# Patient Record
Sex: FEMALE | Race: BLACK OR AFRICAN AMERICAN | Employment: FULL TIME | URBAN - METROPOLITAN AREA
[De-identification: names, ages, dates, MRNs, and addresses within clinical notes are randomized per-mention and may not be internally consistent; named-entity substitution may affect disease eponyms.]

---

## 2017-08-19 ENCOUNTER — HOSPITAL ENCOUNTER (INPATIENT)
Age: 22
LOS: 3 days | Discharge: HOME | DRG: 881 | End: 2017-08-22
Attending: PSYCHIATRY & NEUROLOGY | Admitting: PSYCHIATRY & NEUROLOGY
Payer: SUBSIDIZED

## 2017-08-19 PROBLEM — F33.1 MDD (MAJOR DEPRESSIVE DISORDER), RECURRENT EPISODE, MODERATE (HCC): Status: ACTIVE | Noted: 2017-08-19

## 2017-08-19 PROCEDURE — 74011250637 HC RX REV CODE- 250/637: Performed by: PSYCHIATRY & NEUROLOGY

## 2017-08-19 PROCEDURE — 65220000003 HC RM SEMIPRIVATE PSYCH

## 2017-08-19 RX ORDER — ZOLPIDEM TARTRATE 10 MG/1
10 TABLET ORAL
Status: DISCONTINUED | OUTPATIENT
Start: 2017-08-19 | End: 2017-08-22 | Stop reason: HOSPADM

## 2017-08-19 RX ORDER — ACETAMINOPHEN 325 MG/1
650 TABLET ORAL
Status: DISCONTINUED | OUTPATIENT
Start: 2017-08-19 | End: 2017-08-19 | Stop reason: SDUPTHER

## 2017-08-19 RX ORDER — IBUPROFEN 400 MG/1
400 TABLET ORAL
Status: DISCONTINUED | OUTPATIENT
Start: 2017-08-19 | End: 2017-08-22 | Stop reason: HOSPADM

## 2017-08-19 RX ORDER — ACETAMINOPHEN 325 MG/1
650 TABLET ORAL
Status: DISCONTINUED | OUTPATIENT
Start: 2017-08-19 | End: 2017-08-22 | Stop reason: HOSPADM

## 2017-08-19 RX ORDER — BENZTROPINE MESYLATE 1 MG/ML
2 INJECTION INTRAMUSCULAR; INTRAVENOUS
Status: DISCONTINUED | OUTPATIENT
Start: 2017-08-19 | End: 2017-08-22 | Stop reason: HOSPADM

## 2017-08-19 RX ORDER — OLANZAPINE 5 MG/1
5 TABLET ORAL
Status: DISCONTINUED | OUTPATIENT
Start: 2017-08-19 | End: 2017-08-22 | Stop reason: HOSPADM

## 2017-08-19 RX ORDER — BENZTROPINE MESYLATE 2 MG/1
2 TABLET ORAL
Status: DISCONTINUED | OUTPATIENT
Start: 2017-08-19 | End: 2017-08-22 | Stop reason: HOSPADM

## 2017-08-19 RX ORDER — LORAZEPAM 2 MG/ML
2 INJECTION INTRAMUSCULAR
Status: DISCONTINUED | OUTPATIENT
Start: 2017-08-19 | End: 2017-08-22 | Stop reason: HOSPADM

## 2017-08-19 RX ORDER — IBUPROFEN 200 MG
1 TABLET ORAL
Status: DISCONTINUED | OUTPATIENT
Start: 2017-08-19 | End: 2017-08-22 | Stop reason: HOSPADM

## 2017-08-19 RX ORDER — HYDROXYZINE 50 MG/1
50 TABLET, FILM COATED ORAL
Status: DISCONTINUED | OUTPATIENT
Start: 2017-08-19 | End: 2017-08-22 | Stop reason: HOSPADM

## 2017-08-19 RX ORDER — ADHESIVE BANDAGE
30 BANDAGE TOPICAL DAILY PRN
Status: DISCONTINUED | OUTPATIENT
Start: 2017-08-19 | End: 2017-08-22 | Stop reason: HOSPADM

## 2017-08-19 RX ADMIN — IBUPROFEN 400 MG: 400 TABLET, FILM COATED ORAL at 16:46

## 2017-08-19 RX ADMIN — ZOLPIDEM TARTRATE 10 MG: 10 TABLET ORAL at 21:14
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
## 2017-08-19 NOTE — IP AVS SNAPSHOT
Summary of Care Report The Summary of Care report has been created to help improve care coordination. Users with access to [employer] or [address] (Web-based application) may access additional patient information including the Discharge Summary. If you are not currently a [address] user and need more information, please call the number listed below in the [place] [address] section and ask to be connected with Medical Records. Facility Information Name Address Phone [address] [facility] 7 [phone] [phone] Patient Information Patient Name Sex  Nicole Pierre ([id number]) Female 1995 Discharge Information Admitting Provider Service Area Unit Awa Osuna MD / Georges Teague 140 / [phone] Discharge Provider Discharge Date/Time Discharge Disposition Destination (none) 2017 (Pending) AHR (none) Patient Language Language ENGLISH [13] Hospital Problems as of 2017  Never Reviewed Class Noted - Resolved Last Modified POA Active Problems * (Principal)MDD (major depressive disorder), recurrent episode, moderate ([facility] [address]  2017 - Present 2017 by Susy Pisano MD Unknown Entered by Awa Osuna MD  
  
You are allergic to the following Allergen Reactions Latex Hives Current Discharge Medication List  
  
START taking these medications Dose & Instructions Dispensing Information Comments  
 hydrOXYzine HCl 50 mg tablet Commonly known as:  ATARAX Dose:  50 mg Take 1 Tab by mouth four (4) times daily as needed for Itching for up to 10 days. Indications: anxiety Quantity:  30 Tab Refills:  0  
   
 sertraline 50 mg tablet Commonly known as:  ZOLOFT Dose:  50 mg Take 1 Tab by mouth daily.  Indications: ANXIETY WITH DEPRESSION  
 Quantity:  30 Tab Refills:  0 Follow-up Information Follow up With Details Comments Contact Info Dr. Sonny Hilton On 2017 You have a 9:30am appointment for medication management with the psychiatrist. Derek Su MatthewIgnacio gordillo 
[phone] Adalberto Guillaume On 2017 You have an 11:00am therapy appointment. Saen Wooten, Room 645 [facility] Ignacio Tapia 
[phone] None   None (395) Patient stated that they have no PCP Discharge Instructions DISCHARGE SUMMARY 
 
Stephen Han : 1995 MRN: [id number] The patient Tori Watkins exhibits the ability to control behavior in a less restrictive environment. Patient's level of functioning is improving. No assaultive/destructive behavior has been observed for the past 24 hours. No suicidal/homicidal threat or behavior has been observed for the past 24 hours. There is no evidence of serious medication side effects. Patient has not been in physical or protective restraints for at least the past 24 hours. If weapons involved, how are they secured? Patient's guns have been removed from the home. Is patient aware of and in agreement with discharge plan? Yes Arrangements for medication:  Prescriptions given to patient. Copy of discharge instructions to  provider?:  Yes, Dr. Sonny Hilton ([phone]) Arrangements for transportation home:  Friend to  Keep all follow up appointments as scheduled, continue to take prescribed medications per physician instructions. Mental health crisis number:  230 or your local mental health crisis line number at [phone] ([facility]) or [phone] [facility]). DISCHARGE SUMMARY from Nurse The following personal items are in your possession at time of discharge: 
 
Dental Appliances: None Visual Aid: None Home Medications: None Jewelry: Ring, Earrings, Body Piercing, With patient Clothing: Dress, Pants, Shirt, Undergarments Other Valuables: Cell Phone, Money (comment) (Cell phone in med cabinet. Money with security ) PATIENT INSTRUCTIONS: 
 
What to do at Home: 
Recommended activity: Activity as tolerated, If you experience any of the following symptoms thoughts of harming self, feeling overwhelmed with hopelessness, please follow up with your therapist and psychiatrist. . 
 
 
*  Please give a list of your current medications to your Primary Care Provider. *  Please update this list whenever your medications are discontinued, doses are 
    changed, or new medications (including over-the-counter products) are added. *  Please carry medication information at all times in case of emergency situations. These are general instructions for a healthy lifestyle: No smoking/ No tobacco products/ Avoid exposure to second hand smoke Surgeon General's Warning:  Quitting smoking now greatly reduces serious risk to your health. Obesity, smoking, and sedentary lifestyle greatly increases your risk for illness A healthy diet, regular physical exercise & weight monitoring are important for maintaining a healthy lifestyle You may be retaining fluid if you have a history of heart failure or if you experience any of the following symptoms:  Weight gain of 3 pounds or more overnight or 5 pounds in a week, increased swelling in our hands or feet or shortness of breath while lying flat in bed. Please call your doctor as soon as you notice any of these symptoms; do not wait until your next office visit. Recognize signs and symptoms of STROKE: 
 
F-face looks uneven A-arms unable to move or move unevenly S-speech slurred or non-existent T-time-call 911 as soon as signs and symptoms begin-DO NOT go Back to bed or wait to see if you get better-TIME IS BRAIN.  
 
Warning Signs of HEART ATTACK  
 
 Call 911 if you have these symptoms: 
? Chest discomfort. Most heart attacks involve discomfort in the center of the chest that lasts more than a few minutes, or that goes away and comes back. It can feel like uncomfortable pressure, squeezing, fullness, or pain. ? Discomfort in other areas of the upper body. Symptoms can include pain or discomfort in one or both arms, the back, neck, jaw, or stomach. ? Shortness of breath with or without chest discomfort. ? Other signs may include breaking out in a cold sweat, nausea, or lightheadedness. Don't wait more than five minutes to call [address]! Fast action can save your life. Calling 911 is almost always the fastest way to get lifesaving treatment. Emergency Medical Services staff can begin treatment when they arrive  up to an hour sooner than if someone gets to the hospital by car. The discharge information has been reviewed with the patient. The patient verbalized understanding. Discharge medications reviewed with the patient and appropriate educational materials and side effects teaching were provided. Chart Review Routing History No Routing History on File
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
## 2017-08-19 NOTE — IP AVS SNAPSHOT
[address] 
[phone] Patient: Agustin Andre MRN: [id number] :1995 You are allergic to the following Allergen Reactions Latex Hives Recent Documentation Height Weight Breastfeeding? BMI Smoking Status 1.676 m 111.9 kg No 39.83 kg/m2 Never Smoker About your hospitalization You were admitted on:  2017 You last received care in the:  [address] [place] [address] You were discharged on:  2017 Unit phone number:  [phone] Why you were hospitalized Your primary diagnosis was:  Mdd (Major Depressive Disorder), Recurrent Episode, Moderate (Hcc) Providers Seen During Your Hospitalizations Provider Role Specialty Primary office phone Louis Holloway MD Attending Provider Psychiatry [phone] Debby Wagoner MD Attending Provider Psychiatry [phone] Your Primary Care Physician (PCP) Primary Care Physician Office Phone Office Fax NONE ** None ** ** None ** Follow-up Information Follow up With Details Comments Contact Info Dr. Lloyd Harris On 2017 You have a 9:30am appointment for medication management with the psychiatrist. Rakesh Teague [address] [place] [address] 
[phone] Mago Mcmillan On 2017 You have an 11:00am therapy appointment. Sean Wooten, Room 645 [facility] Ignacio Tapia 
[phone] None   None (395) Patient stated that they have no PCP Current Discharge Medication List  
  
START taking these medications Dose & Instructions Dispensing Information Comments Morning Noon Evening Bedtime  
 hydrOXYzine HCl 50 mg tablet Commonly known as:  ATARAX Your last dose was: Your next dose is:    
   
   
 Dose:  50 mg Take 1 Tab by mouth four (4) times daily as needed for Itching for up to 10 days. Indications: anxiety Quantity:  30 Tab Refills:  0  
     
   
   
   
  
 sertraline 50 mg tablet Commonly known as:  ZOLOFT Your last dose was: Your next dose is:    
   
   
 Dose:  50 mg Take 1 Tab by mouth daily. Indications: ANXIETY WITH DEPRESSION Quantity:  30 Tab Refills:  0 Where to Get Your Medications Information on where to get these meds will be given to you by the nurse or doctor. ! Ask your nurse or doctor about these medications  
  hydrOXYzine HCl 50 mg tablet  
 sertraline 50 mg tablet Discharge Instructions DISCHARGE SUMMARY 
 
Israel Bills : 1995 MRN: [id number] The patient Zander Pineda exhibits the ability to control behavior in a less restrictive environment. Patient's level of functioning is improving. No assaultive/destructive behavior has been observed for the past 24 hours. No suicidal/homicidal threat or behavior has been observed for the past 24 hours. There is no evidence of serious medication side effects. Patient has not been in physical or protective restraints for at least the past 24 hours. If weapons involved, how are they secured? Patient's guns have been removed from the home. Is patient aware of and in agreement with discharge plan? Yes Arrangements for medication:  Prescriptions given to patient. Copy of discharge instructions to  provider?:  Yes, Dr. Damaris Parsons ([phone]) Arrangements for transportation home:  Friend to  Keep all follow up appointments as scheduled, continue to take prescribed medications per physician instructions. Mental health crisis number:  616 or your local mental health crisis line number at [phone] ([place] [address]) or [phone] [facility]). DISCHARGE SUMMARY from Nurse The following personal items are in your possession at time of discharge: 
 
Dental Appliances: None Visual Aid: None Home Medications: None Jewelry: Ring, Earrings, Body Piercing, With patient Clothing: Dress, Pants, Shirt, Undergarments Other Valuables: Cell Phone, Money (comment) (Cell phone in med cabinet. Money with security ) PATIENT INSTRUCTIONS: 
 
What to do at Home: 
Recommended activity: Activity as tolerated, If you experience any of the following symptoms thoughts of harming self, feeling overwhelmed with hopelessness, please follow up with your therapist and psychiatrist. . 
 
 
*  Please give a list of your current medications to your Primary Care Provider. *  Please update this list whenever your medications are discontinued, doses are 
    changed, or new medications (including over-the-counter products) are added. *  Please carry medication information at all times in case of emergency situations. These are general instructions for a healthy lifestyle: No smoking/ No tobacco products/ Avoid exposure to second hand smoke Surgeon General's Warning:  Quitting smoking now greatly reduces serious risk to your health. Obesity, smoking, and sedentary lifestyle greatly increases your risk for illness A healthy diet, regular physical exercise & weight monitoring are important for maintaining a healthy lifestyle You may be retaining fluid if you have a history of heart failure or if you experience any of the following symptoms:  Weight gain of 3 pounds or more overnight or 5 pounds in a week, increased swelling in our hands or feet or shortness of breath while lying flat in bed. Please call your doctor as soon as you notice any of these symptoms; do not wait until your next office visit. Recognize signs and symptoms of STROKE: 
 
F-face looks uneven A-arms unable to move or move unevenly S-speech slurred or non-existent T-time-call 911 as soon as signs and symptoms begin-DO NOT go  
 Back to bed or wait to see if you get better-TIME IS BRAIN. Warning Signs of HEART ATTACK Call 911 if you have these symptoms: 
? Chest discomfort. Most heart attacks involve discomfort in the center of the chest that lasts more than a few minutes, or that goes away and comes back. It can feel like uncomfortable pressure, squeezing, fullness, or pain. ? Discomfort in other areas of the upper body. Symptoms can include pain or discomfort in one or both arms, the back, neck, jaw, or stomach. ? Shortness of breath with or without chest discomfort. ? Other signs may include breaking out in a cold sweat, nausea, or lightheadedness. Don't wait more than five minutes to call [address]! Fast action can save your life. Calling 911 is almost always the fastest way to get lifesaving treatment. Emergency Medical Services staff can begin treatment when they arrive  up to an hour sooner than if someone gets to the hospital by car. The discharge information has been reviewed with the patient. The patient verbalized understanding. Discharge medications reviewed with the patient and appropriate educational materials and side effects teaching were provided. Discharge Orders None [employer] Announcement We are excited to announce that we are making your provider's discharge notes available to you in QRuso. You will see these notes when they are completed and signed by the physician that discharged you from your recent hospital stay. If you have any questions or concerns about any information you see in QRuso, please call the Health Information Department where you were seen or reach out to your Primary Care Provider for more information about your plan of care. Introducing [facility] SERVICES! [facility] introduces [employer]uso patient portal. Now you can access parts of your medical record, email your doctor's office, and request medication refills online. 1. In your internet browser, go to [email or web address] 2. Click on the First Time User? Click Here link in the Sign In box. You will see the New Member Sign Up page. 3. Enter your "[employer]" Access Code exactly as it appears below. You will not need to use this code after youve completed the sign-up process. If you do not sign up before the expiration date, you must request a new code. · "[employer]" Access Code: [email or web address] Expires: 11/20/2017  2:50 PM 
 
4. Enter the last four digits of your Social Security Number (xxxx) and Date of Birth (mm/dd/yyyy) as indicated and click Submit. You will be taken to the next sign-up page. 5. Create a "[employer]" ID. This will be your "[employer]" login ID and cannot be changed, so think of one that is secure and easy to remember. 6. Create a "[employer]" password. You can change your password at any time. 7. Enter your Password Reset Question and Answer. This can be used at a later time if you forget your password. 8. Enter your e-mail address. You will receive e-mail notification when new information is available in [address] 9. Click Sign Up. You can now view and download portions of your medical record. 10. Click the Download Summary menu link to download a portable copy of your medical information. If you have questions, please visit the Frequently Asked Questions section of the "[employer]" website. Remember, "[employer]" is NOT to be used for urgent needs. For medical emergencies, dial 911. Now available from your iPhone and Android! General Information Please provide this summary of care documentation to your next provider. Patient Signature:  ____________________________________________________________ Date:  ____________________________________________________________  
  
Dewane Salen Provider Signature:  ____________________________________________________________ Date:  ____________________________________________________________
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
## 2017-08-19 NOTE — BH NOTES
TRANSFER - IN REPORT:    Verbal report received from Merari Kim RN on [facility]  being received from [facility] for routine progression of care      Report consisted of patients Situation, Background, Assessment and   Recommendations(SBAR). Information from the following report(s) SBAR was reviewed with the receiving nurse. Opportunity for questions and clarification was provided. Assessment completed upon patients arrival to unit and care assumed.

## 2017-08-19 NOTE — IP AVS SNAPSHOT
[address] 
[phone] Patient: Jostin Segundo MRN: [id number] :1995 Current Discharge Medication List  
  
START taking these medications Dose & Instructions Dispensing Information Comments Morning Noon Evening Bedtime  
 hydrOXYzine HCl 50 mg tablet Commonly known as:  ATARAX Your last dose was: Your next dose is:    
   
   
 Dose:  50 mg Take 1 Tab by mouth four (4) times daily as needed for Itching for up to 10 days. Indications: anxiety Quantity:  30 Tab Refills:  0  
     
   
   
   
  
 sertraline 50 mg tablet Commonly known as:  ZOLOFT Your last dose was: Your next dose is:    
   
   
 Dose:  50 mg Take 1 Tab by mouth daily. Indications: ANXIETY WITH DEPRESSION Quantity:  30 Tab Refills:  0 Where to Get Your Medications Information on where to get these meds will be given to you by the nurse or doctor. ! Ask your nurse or doctor about these medications  
  hydrOXYzine HCl 50 mg tablet  
 sertraline 50 mg tablet

## 2017-08-20 LAB
ANION GAP SERPL CALC-SCNC: 8 MMOL/L (ref 5–15)
BUN SERPL-MCNC: 13 MG/DL (ref 6–20)
BUN/CREAT SERPL: 20 (ref 12–20)
CALCIUM SERPL-MCNC: 9 MG/DL (ref 8.5–10.1)
CHLORIDE SERPL-SCNC: 106 MMOL/L (ref 97–108)
CO2 SERPL-SCNC: 25 MMOL/L (ref 21–32)
CREAT SERPL-MCNC: 0.66 MG/DL (ref 0.55–1.02)
ERYTHROCYTE [DISTWIDTH] IN BLOOD BY AUTOMATED COUNT: 12.6 % (ref 11.5–14.5)
GLUCOSE SERPL-MCNC: 69 MG/DL (ref 65–100)
HCT VFR BLD AUTO: 36.4 % (ref 35–47)
HGB BLD-MCNC: 12.3 G/DL (ref 11.5–16)
MCH RBC QN AUTO: 29.9 PG (ref 26–34)
MCHC RBC AUTO-ENTMCNC: 33.8 G/DL (ref 30–36.5)
MCV RBC AUTO: 88.6 FL (ref 80–99)
PLATELET # BLD AUTO: 196 K/UL (ref 150–400)
POTASSIUM SERPL-SCNC: 3.7 MMOL/L (ref 3.5–5.1)
RBC # BLD AUTO: 4.11 M/UL (ref 3.8–5.2)
SODIUM SERPL-SCNC: 139 MMOL/L (ref 136–145)
TSH SERPL DL<=0.05 MIU/L-ACNC: 0.7 UIU/ML (ref 0.36–3.74)
WBC # BLD AUTO: 7.4 K/UL (ref 3.6–11)

## 2017-08-20 PROCEDURE — 84443 ASSAY THYROID STIM HORMONE: CPT | Performed by: HOSPITALIST

## 2017-08-20 PROCEDURE — 85027 COMPLETE CBC AUTOMATED: CPT | Performed by: HOSPITALIST

## 2017-08-20 PROCEDURE — 65220000003 HC RM SEMIPRIVATE PSYCH

## 2017-08-20 PROCEDURE — 80048 BASIC METABOLIC PNL TOTAL CA: CPT | Performed by: HOSPITALIST

## 2017-08-20 PROCEDURE — 36415 COLL VENOUS BLD VENIPUNCTURE: CPT | Performed by: HOSPITALIST

## 2017-08-20 PROCEDURE — 74011250637 HC RX REV CODE- 250/637: Performed by: PSYCHIATRY & NEUROLOGY

## 2017-08-20 RX ADMIN — ZOLPIDEM TARTRATE 10 MG: 10 TABLET ORAL at 22:03

## 2017-08-20 RX ADMIN — HYDROXYZINE HYDROCHLORIDE 50 MG: 50 TABLET, FILM COATED ORAL at 20:39

## 2017-08-20 NOTE — PROGRESS NOTES
Problem: Falls - Risk of  Goal: *Absence of Falls  Document Britany Fall Risk and appropriate interventions in the flowsheet.    Outcome: Progressing Towards Goal  Fall Risk Interventions:              Medication Interventions: Teach patient to arise slowly
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
## 2017-08-20 NOTE — PROGRESS NOTES
Problem: Depressed Mood (Adult/Pediatric) goal will met by 8/23/17  Goal: *STG: Participates in treatment plan  Outcome: Progressing Towards Goal  Pt participated in treatment team.  Goal: *STG: Verbalizes anger, guilt, and other feelings in a constructive manor  Outcome: Progressing Towards Goal  Pt able to verbalize feelings in a constructive manor. Goal: *STG: Attends activities and groups  Outcome: Progressing Towards Goal  Encouraged pt to attend groups and express feelings. Goal: *STG: Demonstrates reduction in symptoms and increase in insight into coping skills/future focused  Outcome: Progressing Towards Goal  Offer pt information on coping skills. Goal: *STG: Remains safe in hospital  Outcome: Progressing Towards Goal  Remains safe in the hospital. Continued on Q 15 minute safety checks. Goal: *STG: Complies with medication therapy  Outcome: Progressing Towards Goal  Not on scheduled medications  Goal: *LTG: Understands illness and can identify signs of relapse  Outcome: Progressing Towards Goal  Reviewed during treatment team causes of negative thoughts. Problem: Falls - Risk of goal will be met by 8/22/17  Goal: *Absence of Falls  Document Gely Alcantar Fall Risk and appropriate interventions in the flowsheet. Outcome: Progressing Towards Goal  Fall Risk Interventions:              Medication Interventions: Teach patient to arise slowly        Pt wearing slip resistant socks and bed in low position. Problem: Anxiety goal will be met by 8/23/17  Goal: *Alleviation of anxiety  Outcome: Progressing Towards Goal  Encouraged pt to express feelings of anxiety with staff.     [address] [facility] [address]  Master Treatment Plan for [facility]    Date Treatment Plan Initiated: 8/20/17    Treatment Plan Modalities:  Type of Modality Amount  (x minutes) Frequency (x/week) Duration (x days) Name of Responsible Staff   Community & wrap-up meetings to encourage peer interactions 15 7 1 Santhosh Doan RN     Group psychotherapy to assist in building coping skills and internal controls 60 7 1 Shawn Ortiz LCSW   Therapeutic activity groups to build coping skills 60 7 1 Shawn ortiz LCSW   Psychoeducation in group setting to address:   Medication education   15 7 1 Santhosh Doan RN   Coping skills         Relaxation techniques         Symptom management         Discharge planning   60 2 [address], [place]   Spirituality    60 2 [facility]   60 1 1 Volunteer [facility]/AA/NA         Physician medication management   13 7 1 Dr. Charlene Reddy meeting/discharge planning

## 2017-08-20 NOTE — PROGRESS NOTES
Problem: Depressed Mood (Adult/Pediatric)  Goal: *STG: Verbalizes anger, guilt, and other feelings in a constructive manor  Outcome: Progressing Towards Goal  Pt in bed at change of shift. Up for dinner. Feels as if she is getting a cold. Reports little change in mood. Spoke of relationship with son's father and her best friend as stressors. Has been seeing a counselor for the past two years in an effort to \"get myself together. \" Support and reassurance given. Will continue to monitor.

## 2017-08-21 PROCEDURE — 74011250637 HC RX REV CODE- 250/637: Performed by: PSYCHIATRY & NEUROLOGY

## 2017-08-21 PROCEDURE — 65220000003 HC RM SEMIPRIVATE PSYCH

## 2017-08-21 RX ORDER — SERTRALINE HYDROCHLORIDE 50 MG/1
50 TABLET, FILM COATED ORAL DAILY
Status: DISCONTINUED | OUTPATIENT
Start: 2017-08-21 | End: 2017-08-22 | Stop reason: HOSPADM

## 2017-08-21 RX ADMIN — SERTRALINE HYDROCHLORIDE 50 MG: 50 TABLET ORAL at 13:24

## 2017-08-21 RX ADMIN — ZOLPIDEM TARTRATE 10 MG: 10 TABLET ORAL at 21:28

## 2017-08-21 RX ADMIN — HYDROXYZINE HYDROCHLORIDE 50 MG: 50 TABLET, FILM COATED ORAL at 13:24

## 2017-08-21 NOTE — PROGRESS NOTES
Problem: Falls - Risk of  Goal: *Absence of Falls  Document Britany Fall Risk and appropriate interventions in the flowsheet.    Outcome: Progressing Towards Goal  Fall Risk Interventions:   Lying quietly in bed with eyes closed , respirations even and unlabored , NAD noted   Bathroom light on for safety , Q15 min safety rounds continue         Medication Interventions: Teach patient to arise slowly

## 2017-08-21 NOTE — PROGRESS NOTES
Problem: Depressed Mood (Adult/Pediatric)  Goal: *STG: Remains safe in hospital  Outcome: Progressing Towards Goal  Pt visible in milieu. Retreats to room intermittently. Visited by grandmothers from [place]. Mood remained flat, blunted during visit. Brightened immediately after visit while speaking on phone. Pt has not demonstrated any self harming behavior. Denying SI but still feeling overwhelmed. Support and reassurance given. Will continue to monitor.

## 2017-08-21 NOTE — INTERDISCIPLINARY ROUNDS
Behavioral Health Interdisciplinary Rounds     Patient Name: Zohra Cotton  Age: 25 y.o. Room/Bed:  727/  Primary Diagnosis: MDD (major depressive disorder), recurrent episode, moderate (HCC)   Admission Status: Voluntary     Readmission within 30 days: no  Power of  in place: no  Patient requires a blocked bed: no          Reason for blocked bed: n/a     VTE Prophylaxis: Not indicated  Mobility needs/Fall risk: no    Nutritional Plan: no  Consults:       Labs/Testing due today?: no    Sleep hours:  8 hours       Participation in Care/Groups:  yes  Medication Compliant?: no scheduled meds ordered   PRNS (last 24 hours): Antianxiety and Sleep Aid    Restraints (last 24 hours):  no  Substance Abuse:  no  CIWA (range last 24 hours):  COWS (range last 24 hours):    Alcohol screening (AUDIT) completed -  AUDIT Score: 0  If applicable, date SBIRT discussed in treatment team AND documented:   Tobacco - patient is a smoker: no      Date tobacco education completed by RN: n/a   24 hour chart check complete: yes     Patient goal(s) for today:   Treatment team focus/goals: Plan to start on Zoloft.   LOS:  2  Expected LOS: TBD   sychiatric Advanced Care Directives -   Name of Decision maker if patient has   sychiatric Care Directive   Patient was offered information   Financial concerns/prescription coverage:    Date of last family contact:      Family requesting physician contact today:   Discharge plan: she will return home when ready for discharge      Outpatient provider(s): will need to be set up before discharge     Participating treatment team members: Tadeo Saxena Dr., RN

## 2017-08-21 NOTE — PROGRESS NOTES
Problem: Depressed Mood (Adult/Pediatric)  Goal: *STG: Participates in treatment plan  Outcome: Progressing Towards Goal  Review meds, discussed medication and d/c plans. Engaged and social w peers. Brighter affect and describes hope  Pt daily goal is to identify triggering feelings and a healthy coping skill  Goal: *STG: Verbalizes anger, guilt, and other feelings in a constructive manor  Outcome: Progressing Towards Goal  Sadness and anxiety related to her roommate and BF behaviors  Goal: *STG: Attends activities and groups  Outcome: Progressing Towards Goal  engaged  Goal: *STG: Demonstrates reduction in symptoms and increase in insight into coping skills/future focused  Outcome: Progressing Towards Goal  Denies SI, able to recognize feelings such as sadness and anxiety are trigger. Healthy coping skills she is currently using are coloring sharing in group and completing WRAP worksheets.    Goal: Interventions  Outcome: Progressing Towards Goal  Staff focus is on coping skills educaiton
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
## 2017-08-21 NOTE — PROGRESS NOTES
08/21/17 1219   Vital Signs   Temp 98.1 °F (36.7 °C)   Temp Source Oral   Pulse (Heart Rate) (!) 116   Resp Rate 18   Level of Consciousness Alert   /82   MAP (Calculated) 97   BP 1 Method Automatic   MEWS Score 3   pt anxious at this time. Will increase monitoring of vital signs.

## 2017-08-21 NOTE — BH NOTES
GROUP THERAPY PROGRESS NOTE    Kirsten Carrasco did not participate in a Morning Process Group on the General Unit with a focus on identifying feelings, planning for the day, and learning more about DBT concepts of \"Mindfulness. \"

## 2017-08-21 NOTE — BH NOTES
1530: Greeted patient on unit in dining room talking with peers. Appeared in no acute distress. 1629: Patient alert, vital stable, wnl.   Medication and meal compliant. Will continue to monitor on Q 15 minute safety checks.
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
## 2017-08-21 NOTE — BH NOTES
PRN Medication Documentation    Specific patient behavior that led to need for PRN medication: itching   Staff interventions attempted prior to PRN being given: cold compress  PRN medication given: atarax 50mg PO  Patient response/effectiveness of PRN medication: effective

## 2017-08-22 VITALS
HEIGHT: 66 IN | TEMPERATURE: 98 F | BODY MASS INDEX: 39.66 KG/M2 | WEIGHT: 246.8 LBS | HEART RATE: 99 BPM | SYSTOLIC BLOOD PRESSURE: 138 MMHG | DIASTOLIC BLOOD PRESSURE: 84 MMHG | OXYGEN SATURATION: 98 % | RESPIRATION RATE: 16 BRPM

## 2017-08-22 PROCEDURE — 74011250637 HC RX REV CODE- 250/637: Performed by: PSYCHIATRY & NEUROLOGY

## 2017-08-22 RX ORDER — HYDROXYZINE 50 MG/1
50 TABLET, FILM COATED ORAL
Qty: 30 TAB | Refills: 0 | Status: SHIPPED | OUTPATIENT
Start: 2017-08-22 | End: 2017-09-01

## 2017-08-22 RX ORDER — SERTRALINE HYDROCHLORIDE 50 MG/1
50 TABLET, FILM COATED ORAL DAILY
Qty: 30 TAB | Refills: 0 | Status: SHIPPED | OUTPATIENT
Start: 2017-08-22 | End: 2019-04-05

## 2017-08-22 RX ADMIN — SERTRALINE HYDROCHLORIDE 50 MG: 50 TABLET ORAL at 08:15

## 2017-08-22 NOTE — BH NOTES
GROUP THERAPY PROGRESS NOTE    Sangita Drummond did not participate in a morning Process Group on the General Unit with a focus identifying feelings, planning for the day, and learning more about DBT concepts on \"Emotion Regulation. \"

## 2017-08-22 NOTE — INTERDISCIPLINARY ROUNDS
Behavioral Health Interdisciplinary Rounds     Patient Name: Dash Villegas  Age: 25 y.o. Room/Bed:  [facility]  Primary Diagnosis: MDD (major depressive disorder), recurrent episode, moderate (HCC)   Admission Status: Voluntary     Readmission within 30 days: no  Power of  in place: no  Patient requires a blocked bed: no          Reason for blocked bed: n/a    VTE Prophylaxis: Not indicated  Mobility needs/Fall risk: no    Nutritional Plan: no  Consults: no         Labs/Testing due today?: no    Sleep hours: 8       Participation in Care/Groups:  yes  Medication Compliant?: Yes  PRNS (last 24 hours): Antianxiety and Sleep Aid    Restraints (last 24 hours):  no  Substance Abuse:  no  CIWA (range last 24 hours):  COWS (range last 24 hours):   Alcohol screening (AUDIT) completed -  AUDIT Score: 0  If applicable, date SBIRT discussed in treatment team AND documented: n/a  Tobacco - patient is a smoker: no   Date tobacco education completed by RN: n/a  24 hour chart check complete: yes     Patient goal(s) for today: Discharge  Treatment team focus/goals: Discharge  LOS:  3  Expected LOS: 3  Psychiatric Advanced Care Directives -  no   Name of Decision maker if patient has Psychiatric Care Directive: None   Patient was offered information, patient declined.    Financial concerns/prescription coverage: Uninsured  Date of last family contact: None    Family requesting physician contact today: No  Discharge plan: Return home       Outpatient provider(s): [address] [place] [address]    Participating treatment team members: Dash Nelly, ANGY Whitmore; Dr. Darin Askew MD; Joe Guillory RN

## 2017-08-22 NOTE — PROGRESS NOTES
Problem: Depressed Mood (Adult/Pediatric)  Goal: *STG: Participates in treatment plan  Outcome: Progressing Towards Goal  Review meds, out on unit social w peers and staff. Mood and affect annoyed and indifferent. Using sarcasm when offering answers to nursing assessment. Pt daily goal is to Ravel Law . Goal: *STG: Verbalizes anger, guilt, and other feelings in a constructive manor  Outcome: Progressing Towards Goal  Appears annoyed, described irritated when discussing her visitors from last  night  Goal: *STG: Attends activities and groups  Outcome: Progressing Towards Goal  Semi engaged  Goal: *STG: Demonstrates reduction in symptoms and increase in insight into coping skills/future focused  Outcome: Progressing Towards Goal  Denies SI, denies symptoms such as hopelessness and SI.  Insight that working on healthy boundaries, delegating to her support system needs and concerns are all healthy coping skills  Goal: *STG: Complies with medication therapy  Outcome: Progressing Towards Goal  compliant  Goal: Interventions  Outcome: Progressing Towards Goal  Staff focus is on d/c planning

## 2017-08-22 NOTE — DISCHARGE INSTRUCTIONS
DISCHARGE SUMMARY    NAME:Kamar Ribeiro  : 1995  MRN: [id number]    The patient Karri Bryant exhibits the ability to control behavior in a less restrictive environment. Patient's level of functioning is improving. No assaultive/destructive behavior has been observed for the past 24 hours. No suicidal/homicidal threat or behavior has been observed for the past 24 hours. There is no evidence of serious medication side effects. Patient has not been in physical or protective restraints for at least the past 24 hours. If weapons involved, how are they secured? Patient's guns have been removed from the home. Is patient aware of and in agreement with discharge plan? Yes    Arrangements for medication:  Prescriptions given to patient. Copy of discharge instructions to  provider?:  Yes, Dr. Shankar Lane ([phone])    Arrangements for transportation home:  Friend to     Keep all follow up appointments as scheduled, continue to take prescribed medications per physician instructions. Mental health crisis number:  490 or your local mental health crisis line number at [phone] ([place] [address]) or [phone] [facility]). DISCHARGE SUMMARY from Nurse    The following personal items are in your possession at time of discharge:    Dental Appliances: None  Visual Aid: None     Home Medications: None  Jewelry: Ring, Earrings, Body Piercing, With patient  Clothing: Dress, Pants, Shirt, Undergarments  Other Valuables: Cell Phone, Money (comment) (Cell phone in med cabinet. Money with security )             PATIENT INSTRUCTIONS:    What to do at Home:  Recommended activity: Activity as tolerated,     If you experience any of the following symptoms thoughts of harming self, feeling overwhelmed with hopelessness, please follow up with your therapist and psychiatrist. .      *  Please give a list of your current medications to your Primary Care Provider.     *  Please update this list whenever your medications are discontinued, doses are      changed, or new medications (including over-the-counter products) are added. *  Please carry medication information at all times in case of emergency situations. These are general instructions for a healthy lifestyle:    No smoking/ No tobacco products/ Avoid exposure to second hand smoke    Surgeon General's Warning:  Quitting smoking now greatly reduces serious risk to your health. Obesity, smoking, and sedentary lifestyle greatly increases your risk for illness    A healthy diet, regular physical exercise & weight monitoring are important for maintaining a healthy lifestyle    You may be retaining fluid if you have a history of heart failure or if you experience any of the following symptoms:  Weight gain of 3 pounds or more overnight or 5 pounds in a week, increased swelling in our hands or feet or shortness of breath while lying flat in bed. Please call your doctor as soon as you notice any of these symptoms; do not wait until your next office visit. Recognize signs and symptoms of STROKE:    F-face looks uneven    A-arms unable to move or move unevenly    S-speech slurred or non-existent    T-time-call 911 as soon as signs and symptoms begin-DO NOT go       Back to bed or wait to see if you get better-TIME IS BRAIN. Warning Signs of HEART ATTACK     Call 911 if you have these symptoms:   Chest discomfort. Most heart attacks involve discomfort in the center of the chest that lasts more than a few minutes, or that goes away and comes back. It can feel like uncomfortable pressure, squeezing, fullness, or pain.  Discomfort in other areas of the upper body. Symptoms can include pain or discomfort in one or both arms, the back, neck, jaw, or stomach.  Shortness of breath with or without chest discomfort.  Other signs may include breaking out in a cold sweat, nausea, or lightheadedness.   Don't wait more than five minutes to call 911 - MINUTES MATTER! Fast action can save your life. Calling 911 is almost always the fastest way to get lifesaving treatment. Emergency Medical Services staff can begin treatment when they arrive -- up to an hour sooner than if someone gets to the hospital by car. The discharge information has been reviewed with the patient. The patient verbalized understanding. Discharge medications reviewed with the patient and appropriate educational materials and side effects teaching were provided.

## 2017-08-22 NOTE — BH NOTES
Pt discharged with her family. No distress noted. Denies SI/HI at this time. All belongings returned to pt. Discharge instructions reviewed and signed pt pt.

## 2017-08-22 NOTE — BH NOTES
Pt  resting comfortably in bed. Respirations even and unlabored. NAD. Continue to monitor via 15 minute observation. 24 hour chart review completed.
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
## 2017-08-22 NOTE — BH NOTES
PRN Medication Documentation    Specific patient behavior that led to need for PRN medication: promotion of rest  Staff interventions attempted prior to PRN being given: milieu quiet, lights dim  PRN medication given: ambien 10mg po prn as ordered  Patient response/effectiveness of PRN medication: prn effective. Pt sleeping upon reassessment.
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
## 2017-08-22 NOTE — PROGRESS NOTES
Chart reviewed and discussed with nurse. AFVSS. Patient has no active medical problems (has chronic psoriatic arthritis). Will sign off as no active medical problems. Please call if urgent medical attention is needed. Thank you for allowing us to participate in the care of your patient.     Tom Gill MD, S

## 2019-04-05 ENCOUNTER — HOSPITAL ENCOUNTER (INPATIENT)
Age: 24
LOS: 3 days | Discharge: HOME | DRG: 881 | End: 2019-04-09
Attending: EMERGENCY MEDICINE | Admitting: PSYCHIATRY & NEUROLOGY
Payer: COMMERCIAL

## 2019-04-05 DIAGNOSIS — R45.851 SUICIDAL IDEATION: Primary | ICD-10-CM

## 2019-04-05 LAB
BASOPHILS # BLD: 0 K/UL (ref 0–0.1)
BASOPHILS NFR BLD: 0 % (ref 0–1)
DIFFERENTIAL METHOD BLD: ABNORMAL
EOSINOPHIL # BLD: 0.1 K/UL (ref 0–0.4)
EOSINOPHIL NFR BLD: 1 % (ref 0–7)
ERYTHROCYTE [DISTWIDTH] IN BLOOD BY AUTOMATED COUNT: 11.9 % (ref 11.5–14.5)
HCT VFR BLD AUTO: 36 % (ref 35–47)
HGB BLD-MCNC: 12.1 G/DL (ref 11.5–16)
IMM GRANULOCYTES # BLD AUTO: 0.1 K/UL (ref 0–0.04)
IMM GRANULOCYTES NFR BLD AUTO: 1 % (ref 0–0.5)
LYMPHOCYTES # BLD: 2.5 K/UL (ref 0.8–3.5)
LYMPHOCYTES NFR BLD: 26 % (ref 12–49)
MCH RBC QN AUTO: 30.8 PG (ref 26–34)
MCHC RBC AUTO-ENTMCNC: 33.6 G/DL (ref 30–36.5)
MCV RBC AUTO: 91.6 FL (ref 80–99)
MONOCYTES # BLD: 0.6 K/UL (ref 0–1)
MONOCYTES NFR BLD: 6 % (ref 5–13)
NEUTS SEG # BLD: 6.4 K/UL (ref 1.8–8)
NEUTS SEG NFR BLD: 66 % (ref 32–75)
NRBC # BLD: 0 K/UL (ref 0–0.01)
NRBC BLD-RTO: 0 PER 100 WBC
PLATELET # BLD AUTO: 215 K/UL (ref 150–400)
PMV BLD AUTO: 11 FL (ref 8.9–12.9)
RBC # BLD AUTO: 3.93 M/UL (ref 3.8–5.2)
WBC # BLD AUTO: 9.6 K/UL (ref 3.6–11)

## 2019-04-05 PROCEDURE — 80307 DRUG TEST PRSMV CHEM ANLYZR: CPT

## 2019-04-05 PROCEDURE — 74011250637 HC RX REV CODE- 250/637: Performed by: EMERGENCY MEDICINE

## 2019-04-05 PROCEDURE — 36415 COLL VENOUS BLD VENIPUNCTURE: CPT

## 2019-04-05 PROCEDURE — 99285 EMERGENCY DEPT VISIT HI MDM: CPT

## 2019-04-05 PROCEDURE — 84443 ASSAY THYROID STIM HORMONE: CPT

## 2019-04-05 PROCEDURE — 85025 COMPLETE CBC W/AUTO DIFF WBC: CPT

## 2019-04-05 PROCEDURE — 90791 PSYCH DIAGNOSTIC EVALUATION: CPT

## 2019-04-05 PROCEDURE — 80053 COMPREHEN METABOLIC PANEL: CPT

## 2019-04-05 RX ORDER — ACETAMINOPHEN 325 MG/1
650 TABLET ORAL
Status: COMPLETED | OUTPATIENT
Start: 2019-04-05 | End: 2019-04-05

## 2019-04-05 RX ADMIN — ACETAMINOPHEN 650 MG: 325 TABLET ORAL at 20:21

## 2019-04-05 NOTE — ED TRIAGE NOTES
Arrived ambulatory from home with grandmother and son. Per grandmother patient has \"been under a lot of stress and is having a mental breakdown\". Patient crying and rocking in bed covering eyes and unable to answer questions. Per grandmother patient is endorsing SI/HI, unclear to which. Patient changed into gown, belongings secured at nurses station.
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
## 2019-04-06 PROBLEM — F32.A DEPRESSIVE DISORDER: Status: ACTIVE | Noted: 2019-04-06

## 2019-04-06 LAB
ALBUMIN SERPL-MCNC: 4 G/DL (ref 3.5–5)
ALBUMIN/GLOB SERPL: 1.1 {RATIO} (ref 1.1–2.2)
ALP SERPL-CCNC: 89 U/L (ref 45–117)
ALT SERPL-CCNC: 27 U/L (ref 12–78)
AMPHET UR QL SCN: NEGATIVE
ANION GAP SERPL CALC-SCNC: 6 MMOL/L (ref 5–15)
APAP SERPL-MCNC: <2 UG/ML (ref 10–30)
APPEARANCE UR: ABNORMAL
AST SERPL-CCNC: 24 U/L (ref 15–37)
BACTERIA URNS QL MICRO: ABNORMAL /HPF
BARBITURATES UR QL SCN: NEGATIVE
BENZODIAZ UR QL: NEGATIVE
BILIRUB SERPL-MCNC: 1.3 MG/DL (ref 0.2–1)
BILIRUB UR QL: NEGATIVE
BUN SERPL-MCNC: 12 MG/DL (ref 6–20)
BUN/CREAT SERPL: 17 (ref 12–20)
CALCIUM SERPL-MCNC: 9.3 MG/DL (ref 8.5–10.1)
CANNABINOIDS UR QL SCN: NEGATIVE
CHLORIDE SERPL-SCNC: 110 MMOL/L (ref 97–108)
CO2 SERPL-SCNC: 24 MMOL/L (ref 21–32)
COCAINE UR QL SCN: NEGATIVE
COLOR UR: ABNORMAL
CREAT SERPL-MCNC: 0.7 MG/DL (ref 0.55–1.02)
DRUG SCRN COMMENT,DRGCM: NORMAL
EPITH CASTS URNS QL MICRO: ABNORMAL /LPF
ETHANOL SERPL-MCNC: <10 MG/DL
GLOBULIN SER CALC-MCNC: 3.6 G/DL (ref 2–4)
GLUCOSE SERPL-MCNC: 93 MG/DL (ref 65–100)
GLUCOSE UR STRIP.AUTO-MCNC: NEGATIVE MG/DL
HCG UR QL: NEGATIVE
HGB UR QL STRIP: ABNORMAL
KETONES UR QL STRIP.AUTO: ABNORMAL MG/DL
LEUKOCYTE ESTERASE UR QL STRIP.AUTO: ABNORMAL
METHADONE UR QL: NEGATIVE
NITRITE UR QL STRIP.AUTO: POSITIVE
OPIATES UR QL: NEGATIVE
PCP UR QL: NEGATIVE
PH UR STRIP: 5.5 [PH] (ref 5–8)
POTASSIUM SERPL-SCNC: 3.8 MMOL/L (ref 3.5–5.1)
PROT SERPL-MCNC: 7.6 G/DL (ref 6.4–8.2)
PROT UR STRIP-MCNC: ABNORMAL MG/DL
RBC #/AREA URNS HPF: ABNORMAL /HPF (ref 0–5)
SALICYLATES SERPL-MCNC: <1.7 MG/DL (ref 2.8–20)
SODIUM SERPL-SCNC: 140 MMOL/L (ref 136–145)
SP GR UR REFRACTOMETRY: 1.03 (ref 1–1.03)
TSH SERPL DL<=0.05 MIU/L-ACNC: 0.64 UIU/ML (ref 0.36–3.74)
UA: UC IF INDICATED,UAUC: ABNORMAL
UROBILINOGEN UR QL STRIP.AUTO: 1 EU/DL (ref 0.2–1)
WBC URNS QL MICRO: ABNORMAL /HPF (ref 0–4)

## 2019-04-06 PROCEDURE — 74011250637 HC RX REV CODE- 250/637

## 2019-04-06 PROCEDURE — 81001 URINALYSIS AUTO W/SCOPE: CPT

## 2019-04-06 PROCEDURE — 87077 CULTURE AEROBIC IDENTIFY: CPT

## 2019-04-06 PROCEDURE — 74011250637 HC RX REV CODE- 250/637: Performed by: PHYSICIAN ASSISTANT

## 2019-04-06 PROCEDURE — 87086 URINE CULTURE/COLONY COUNT: CPT

## 2019-04-06 PROCEDURE — 74011250637 HC RX REV CODE- 250/637: Performed by: EMERGENCY MEDICINE

## 2019-04-06 PROCEDURE — 81025 URINE PREGNANCY TEST: CPT

## 2019-04-06 PROCEDURE — 80307 DRUG TEST PRSMV CHEM ANLYZR: CPT

## 2019-04-06 PROCEDURE — 74011636637 HC RX REV CODE- 636/637: Performed by: PHYSICIAN ASSISTANT

## 2019-04-06 PROCEDURE — 87186 SC STD MICRODIL/AGAR DIL: CPT

## 2019-04-06 PROCEDURE — 65220000003 HC RM SEMIPRIVATE PSYCH

## 2019-04-06 RX ORDER — TRAZODONE HYDROCHLORIDE 50 MG/1
50 TABLET ORAL
Status: DISCONTINUED | OUTPATIENT
Start: 2019-04-06 | End: 2019-04-09 | Stop reason: HOSPADM

## 2019-04-06 RX ORDER — BENZTROPINE MESYLATE 2 MG/1
2 TABLET ORAL
Status: DISCONTINUED | OUTPATIENT
Start: 2019-04-06 | End: 2019-04-09 | Stop reason: HOSPADM

## 2019-04-06 RX ORDER — ADHESIVE BANDAGE
30 BANDAGE TOPICAL DAILY PRN
Status: DISCONTINUED | OUTPATIENT
Start: 2019-04-06 | End: 2019-04-09 | Stop reason: HOSPADM

## 2019-04-06 RX ORDER — HYDROXYZINE 50 MG/1
TABLET, FILM COATED ORAL
Status: DISPENSED
Start: 2019-04-06 | End: 2019-04-06

## 2019-04-06 RX ORDER — OLANZAPINE 5 MG/1
5 TABLET ORAL
Status: DISCONTINUED | OUTPATIENT
Start: 2019-04-06 | End: 2019-04-09 | Stop reason: HOSPADM

## 2019-04-06 RX ORDER — IBUPROFEN 400 MG/1
400 TABLET ORAL
Status: DISCONTINUED | OUTPATIENT
Start: 2019-04-06 | End: 2019-04-09 | Stop reason: HOSPADM

## 2019-04-06 RX ORDER — ACETAMINOPHEN 325 MG/1
650 TABLET ORAL
Status: DISCONTINUED | OUTPATIENT
Start: 2019-04-06 | End: 2019-04-09 | Stop reason: HOSPADM

## 2019-04-06 RX ORDER — PREDNISONE 10 MG/1
TABLET ORAL
Status: DISPENSED
Start: 2019-04-06 | End: 2019-04-06

## 2019-04-06 RX ORDER — BENZTROPINE MESYLATE 1 MG/ML
2 INJECTION INTRAMUSCULAR; INTRAVENOUS
Status: DISCONTINUED | OUTPATIENT
Start: 2019-04-06 | End: 2019-04-09 | Stop reason: HOSPADM

## 2019-04-06 RX ORDER — SERTRALINE HYDROCHLORIDE 50 MG/1
50 TABLET, FILM COATED ORAL DAILY
Status: DISCONTINUED | OUTPATIENT
Start: 2019-04-06 | End: 2019-04-09 | Stop reason: HOSPADM

## 2019-04-06 RX ORDER — HYDROXYZINE 50 MG/1
50 TABLET, FILM COATED ORAL
Status: COMPLETED | OUTPATIENT
Start: 2019-04-06 | End: 2019-04-06

## 2019-04-06 RX ORDER — ZOLPIDEM TARTRATE 10 MG/1
10 TABLET ORAL
Status: DISCONTINUED | OUTPATIENT
Start: 2019-04-06 | End: 2019-04-06 | Stop reason: CLARIF

## 2019-04-06 RX ORDER — CEPHALEXIN 500 MG/1
500 CAPSULE ORAL
Status: COMPLETED | OUTPATIENT
Start: 2019-04-06 | End: 2019-04-06

## 2019-04-06 RX ORDER — ZOLPIDEM TARTRATE 5 MG/1
5 TABLET ORAL
Status: DISCONTINUED | OUTPATIENT
Start: 2019-04-06 | End: 2019-04-06 | Stop reason: CLARIF

## 2019-04-06 RX ADMIN — HYDROXYZINE HYDROCHLORIDE 50 MG: 50 TABLET, FILM COATED ORAL at 01:32

## 2019-04-06 RX ADMIN — CEPHALEXIN 500 MG: 500 CAPSULE ORAL at 02:15

## 2019-04-06 RX ADMIN — PREDNISONE 50 MG: 10 TABLET ORAL at 01:32

## 2019-04-06 RX ADMIN — SERTRALINE HYDROCHLORIDE 50 MG: 50 TABLET ORAL at 11:37
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
## 2019-04-06 NOTE — PROGRESS NOTES
Problem: Discharge Planning Goal: *Discharge to safe environment Outcome: Progressing Towards Goal 
Note:  
Patient identifies home as a safe environment. Patient will return home upon discharge. Goal: *Knowledge of medication management Outcome: Progressing Towards Goal 
Note:  
Patient verbalizes understanding of medication regimen. Patient agrees to take all medications as prescribed. Goal: *Knowledge of discharge instructions Outcome: Progressing Towards Goal 
Note:  
Patient has understanding of discharge and would fellow as instructed Problem: Patient Education: Go to Patient Education Activity Goal: Patient/Family Education Note:  
Patient and family(upon pt's consent) would be educated on discharge plans/instructions.

## 2019-04-06 NOTE — ED NOTES
Reassessed patient at this time. She continues to be safe in her current room. Tech currently sticking patient for her lab work, and RN made patient aware that she needs to provide a urine sample. Patient states she will be able to give a sample if she can have something to drink. Patient given water. Patient continues to be very worried about her overall situation. Continues to contract for safety at this time. Belongings remain at nurses station. Curtain remains open. Best friend remains at bedside.

## 2019-04-06 NOTE — ED NOTES
Assumed care of patient at this time. Patient is currently in her room with the curtain open. Best friend at bedside. Patient states that she is under an enormous amount of stress at this time. She states that she is concerned that she may lose custody of her son. Also states her job is very stressful. She currently has exzema and psoriasis throughout her skin, she reports being itchy all over. Patient's belongings were previously secured at this nurses station. Patient currently contracts for safety. She appears depressed, anxious and worried. Will continue to monitor patient closely at this time.

## 2019-04-06 NOTE — ED NOTES
Reassessed patient at this time. She continues to remain safe in her environment. Asked PA to evaluate patient's skin due to the excessive itching that patient is experiencing. PA agreed to order some medications to help patient be more comfortable. Patient appreciative.
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
## 2019-04-06 NOTE — PROGRESS NOTES
TRANSFER - IN REPORT: 
 
Verbal report received from DENZEL Sarmiento  on link bird  being received from [facility]/ED for routine progression of care Report consisted of patients Situation, Background, Assessment and  
Recommendations(SBAR). Information from the following report(s) SBAR, Kardex, ED Summary, [facility] and Recent Results was reviewed with the receiving nurse. Opportunity for questions and clarification was provided. Assessment completed upon patients arrival to unit and care assumed.
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
## 2019-04-06 NOTE — PROGRESS NOTES
Problem: Depressed Mood (Adult/Pediatric) Goal: *STG: Participates in treatment plan 1/6/7915 [id number] by Lance Cristina Outcome: Progressing Towards Goal 
Note:  
Patient participated in treatment team.  
9/4/6398 [id number] by Lance Cristina Outcome: Progressing Towards Goal 
1/3/4235 [id number] by Lance Cristina Outcome: Progressing Towards Goal 
Goal: *STG: Verbalizes anger, guilt, and other feelings in a constructive manor Outcome: Progressing Towards Goal 
Goal: *STG: Complies with medication therapy 9/3/6143 [id number] by Lance Cristina Outcome: Progressing Towards Goal 
4/1/1079 [id number] by Lance Cristina Outcome: Progressing Towards Goal

## 2019-04-06 NOTE — ED NOTES
1900: Patient crying in room, states [facility] has opened a case on her for something work related with a child. Is afraid her son will be taken away from her. 2000: Patient requesting medication for HA, MD notifed. 2100: Patient in bed, family at bedside. Requesting medication for psoriasis. Does not take anything at home. MD notified, no orders received. 2200: SBAR report given to Krysta Paredes RN. [employer] at bedside.

## 2019-04-06 NOTE — ROUTINE PROCESS
TRANSFER - OUT REPORT: 
 
Verbal report given to Sanket Dill RN(name) on [facility]  being transferred to (unit) for routine progression of care Report consisted of patients Situation, Background, Assessment and  
Recommendations(SBAR). Information from the following report(s) SBAR, ED Summary and MAR was reviewed with the receiving nurse. Lines:    
 
Opportunity for questions and clarification was provided. Patient transported with: 
 Registered Nurse and security

## 2019-04-06 NOTE — PROGRESS NOTES
Patient participates in treatment plan. Lengthy story about life stressors, work situation, blaming others. Redirect patient to answer questions as asked and to stay focused on them. \ 
 
 
[address] Master Treatment Plan for [employer] Date Treatment Plan Initiated: 04/06/19 Treatment Plan Modalities: 
Type of Modality Amount (x minutes) Frequency (x/week) Duration (x days) Name of Responsible Staff Community & wrap-up meetings to encourage peer interactions 15 7 1 Jhon Martin Group psychotherapy to assist in building coping skills and internal controls 60 7 [address] [facility] Therapeutic activity groups to build coping skills 60 7 1 Shawn Marlow Psychoeducation in group setting to address:  
Medication education [address], RN Coping skills Relaxation techniques Symptom management Discharge planning [facility] [address] [facility] Salo Marrero 60 1 1 volunteer Recovery/AA/NA 
    volunteer Physician medication management 15 7 1 Riccardo/Mae Family meeting/discharge planning Hans Griffin

## 2019-04-06 NOTE — ED NOTES
Reassessed patient at this time. She has provided her urine sample in the past hour and all labs have now been sent. Patient questioning if she can eat and drink, she was told yes. Patient provided with a TV dinner and water per her request. Best friend states she needs to leave at this time. Patient continues to be compliant, and continues to contract for safety. She was given her Marisue Lessen and glasses from her belongings. Door is now open for easy visualization of patient.

## 2019-04-07 PROCEDURE — 65220000003 HC RM SEMIPRIVATE PSYCH

## 2019-04-07 PROCEDURE — 74011250637 HC RX REV CODE- 250/637: Performed by: PSYCHIATRY & NEUROLOGY

## 2019-04-07 PROCEDURE — 74011250637 HC RX REV CODE- 250/637: Performed by: NURSE PRACTITIONER

## 2019-04-07 PROCEDURE — 74011250637 HC RX REV CODE- 250/637

## 2019-04-07 RX ORDER — HYDROCORTISONE 1 %
CREAM (GRAM) TOPICAL
Status: DISCONTINUED | OUTPATIENT
Start: 2019-04-07 | End: 2019-04-09 | Stop reason: HOSPADM

## 2019-04-07 RX ORDER — CEFUROXIME AXETIL 250 MG/1
250 TABLET ORAL 2 TIMES DAILY
Status: DISCONTINUED | OUTPATIENT
Start: 2019-04-07 | End: 2019-04-09 | Stop reason: HOSPADM

## 2019-04-07 RX ORDER — TRIAMCINOLONE ACETONIDE 0.25 MG/G
CREAM TOPICAL
COMMUNITY
End: 2019-04-09

## 2019-04-07 RX ORDER — HYDROXYZINE 50 MG/1
50 TABLET, FILM COATED ORAL
Status: DISCONTINUED | OUTPATIENT
Start: 2019-04-07 | End: 2019-04-09 | Stop reason: HOSPADM

## 2019-04-07 RX ADMIN — HYDROCORTISONE: 1 CREAM TOPICAL at 18:40

## 2019-04-07 RX ADMIN — HYDROXYZINE HYDROCHLORIDE 50 MG: 50 TABLET, FILM COATED ORAL at 18:40

## 2019-04-07 RX ADMIN — TRAZODONE HYDROCHLORIDE 50 MG: 50 TABLET ORAL at 21:53

## 2019-04-07 RX ADMIN — CEFUROXIME AXETIL 250 MG: 250 TABLET ORAL at 14:00

## 2019-04-07 RX ADMIN — SERTRALINE HYDROCHLORIDE 50 MG: 50 TABLET ORAL at 10:51

## 2019-04-07 RX ADMIN — CEFUROXIME AXETIL 250 MG: 250 TABLET ORAL at 21:51
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
## 2019-04-07 NOTE — BH NOTES
TRANSFER - OUT REPORT: 
 
Verbal report given to Devin Hua RN, on [facility]  being transferred to room number 446 for routine progression of care Report consisted of patients Situation, Background, Assessment and  
Recommendations(SBAR). Information from the following report(s) SBAR was reviewed with the receiving nurse. Opportunity for questions and clarification was provided. Patient transported with: 
 [employer]

## 2019-04-07 NOTE — PROGRESS NOTES
1500: Patient in bed sleeping. 1630: Out on unit. Appropriate with staff and peers. Med/ meal compliant. Participated in tx team today. Will continue to monitor with q15 minute checks. 2000: Attended group this evening. Verbalized to writer frustrations with staff and patients at place of employment. Problem: Depressed Mood (Adult/Pediatric) Goal: *STG: Participates in treatment plan Outcome: Progressing Towards Goal 
Goal: *STG: Attends activities and groups Outcome: Progressing Towards Goal 
Goal: *STG: Complies with medication therapy Outcome: Progressing Towards Goal
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
## 2019-04-07 NOTE — PROGRESS NOTES
Problem: Depressed Mood (Adult/Pediatric) Goal: *STG: Participates in treatment plan Outcome: Progressing Towards Goal 
Note:  
Pt participated in treatment team. Stated \"I am calmer now because I don't have the issues in here\". Able to verbalize feelings to MD and staff. Goal: Interventions Outcome: Progressing Towards Goal 
  
Problem: Patient Education: Go to Patient Education Activity Goal: Patient/Family Education Outcome: Progressing Towards Goal

## 2019-04-07 NOTE — BH NOTES
GROUP THERAPY PROGRESS NOTE Serena Culver is participating in [employer]. Group time: 15 minutes Personal goal for participation: talk with doctor Goal orientation: community Group therapy participation: active Therapeutic interventions reviewed and discussed: yes Impression of participation: engaged

## 2019-04-07 NOTE — PROGRESS NOTES
Problem: Depressed Mood (Adult/Pediatric) Goal: *STG: Verbalizes anger, guilt, and other feelings in a constructive manor Outcome: Progressing Towards Goal 
At the change of shift  verbalized anxiety about her job , making it difficult to sleep , encouraged using breathing exercises  to relax with good results Lying quietly in bed with eyes closed, respirations even and unlabored , NAD noted Q15 min safety monitoring continues

## 2019-04-07 NOTE — BH NOTES
PRN Medication Documentation Specific patient behavior that led to need for PRN medication: itching Staff interventions attempted prior to PRN being given: evaluation PRN medication given: atarax 50 mg po and hydrocortisone cream 1% @ 1840 Patient response/effectiveness of PRN medication: will continue to monitor

## 2019-04-07 NOTE — INTERDISCIPLINARY ROUNDS
Behavioral Health Interdisciplinary Rounds Patient Name: Winsome Dutta  Age: 25 y.o. Room/Bed:  727/02 Primary Diagnosis: <principal problem not specified> Admission Status: Voluntary Readmission within 30 days: no 
Power of  in place: no 
Patient requires a blocked bed: no          Reason for blocked bed: VTE Prophylaxis: No 
 
Mobility needs/Fall risk: no 
Flu Vaccine : yes Nutritional Plan: no 
Consults:        
Labs/Testing due today?: no 
 
Sleep hours:  6 Participation in Care/Groups:  yes Medication Compliant?: Yes PRNS (last 24 hours): None Restraints (last 24 hours):  no 
  
CIWA (range last 24 hours): COWS (range last 24 hours): Alcohol screening (AUDIT) completed -   AUDIT Score: 2 If applicable, date SBIRT discussed in treatment team AND documented:  
AUDIT Screen Score: AUDIT Score: 2 Document Brief Intervention (corresponds directly with the 5 A's, Ask, Advise, Assess, Assist, and Arrange): At- Risk Patients (Score 7-15 for women; 8-15 for men) Discuss concern patient is drinking at unhealthy levels known to increase risk of alcohol-related health problems. Is Patient ready to commit to change? If No: 
? Encourage reflection ? Discuss short term and long term health risks of consuming alcohol ? Barriers to change ? Reaffirm willingness to help / Educational materials provided If Yes: 
? Set goal 
? Plan 
? Educational materials provided Harmful use or Dependence (Score 16 or greater) ? Discuss short term and long term health risks of consuming alcohol ? Recommendations ? Negotiate drinking goal 
? Recommend addiction specialist/center ? Arrange follow-up appointments. Tobacco - patient is a smoker: Have You Used Tobacco in the Past 30 Days: No 
Illegal Drugs use: Have You Used Any Illegal Substances Over the Past 12 Months: No 
 
24 hour chart check complete:yes Patient goal(s) for today: Treatment team focus/goals:  
Progress note LOS:  1  Expected LOS:  
 
Financial concerns/prescription coverage:  
Date of last family contact:     
Family requesting physician contact today:   
Discharge plan:  
Guns in the home: Outpatient provider(s):  
 
Participating treatment team members: Ld Cintron, * (assigned SW),

## 2019-04-07 NOTE — PROGRESS NOTES
1500: out on unit talking with peers. Participates in tx plan and is able to verbalize feelings constructively. Med/ meal and group compliant. VS within normal range. Appropriate with staff and peers. Will continue to monitor with q15 minute checks throughout the shift. Problem: Depressed Mood (Adult/Pediatric) Goal: *STG: Participates in treatment plan 4/7/2019 1943 by Lucrecia Shearer RN Outcome: Progressing Towards Goal 
4/7/2019 1943 by Lucrecia Shearer RN Outcome: Progressing Towards Goal 
4/7/2019 1806 by Lucrecai Shearer RN Reactivated 4/7/2019 1725 by Lucrecia Shearer RN Outcome: Resolved/Met Problem: Depressed Mood (Adult/Pediatric) Goal: *STG: Verbalizes anger, guilt, and other feelings in a constructive manor 4/7/2019 1943 by Lucrecia Shearer RN Outcome: Progressing Towards Goal 
4/7/2019 1943 by Lucrecia Shearer RN Outcome: Progressing Towards Goal 
4/7/2019 1806 by Lucrecia Shearer RN Reactivated 4/7/2019 1725 by Lucrecia Shearer RN Outcome: Resolved/Met Problem: Depressed Mood (Adult/Pediatric) Goal: *STG: Attends activities and groups 4/7/2019 1943 by Lucrecia Shearer RN Outcome: Progressing Towards Goal 
4/7/2019 1943 by Lucrecia Shearer RN Outcome: Progressing Towards Goal 
4/7/2019 1806 by Lucrecia Shearer RN Reactivated 4/7/2019 1725 by Lucrecia Shearer RN Outcome: Resolved/Met

## 2019-04-07 NOTE — BH NOTES
Paged Rivas Preston NP regarding consult for possible UTI. 
 
12:37 pm-paged Rivas Preston NP regarding Gram Negative Urine Culture. 12:49 Kadi Mcbride NP notified of consult and Gram Negative Urine Culture.

## 2019-04-08 LAB
BACTERIA SPEC CULT: ABNORMAL
CC UR VC: ABNORMAL
SERVICE CMNT-IMP: ABNORMAL

## 2019-04-08 PROCEDURE — 74011250637 HC RX REV CODE- 250/637: Performed by: NURSE PRACTITIONER

## 2019-04-08 PROCEDURE — 65220000003 HC RM SEMIPRIVATE PSYCH

## 2019-04-08 PROCEDURE — 74011250637 HC RX REV CODE- 250/637

## 2019-04-08 RX ADMIN — SERTRALINE HYDROCHLORIDE 50 MG: 50 TABLET ORAL at 10:19

## 2019-04-08 RX ADMIN — CEFUROXIME AXETIL 250 MG: 250 TABLET ORAL at 21:12

## 2019-04-08 RX ADMIN — CEFUROXIME AXETIL 250 MG: 250 TABLET ORAL at 10:18

## 2019-04-08 RX ADMIN — HYDROCORTISONE: 1 CREAM TOPICAL at 10:18

## 2019-04-08 RX ADMIN — HYDROXYZINE HYDROCHLORIDE 50 MG: 50 TABLET, FILM COATED ORAL at 10:19

## 2019-04-08 NOTE — PROGRESS NOTES
Problem: Depressed Mood (Adult/Pediatric) Goal: *STG: Participates in treatment plan Outcome: Progressing Towards Goal 
Note:  
Patient participates in treatment plan. Patient's history and medications discussed, work and legal issues discussed, blaming others, patient's thoughts are organized, patient denies si, patient is cooperative, attending groups, med and meal compliant. Goal: *STG: Attends activities and groups Outcome: Progressing Towards Goal 
Note:  
Patient attends activities and groups. Patient is participating in groups.
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
## 2019-04-08 NOTE — INTERDISCIPLINARY ROUNDS
Behavioral Health Interdisciplinary Rounds Patient Name: Prisca Jay  Age: 25 y.o. Room/Bed:  727/02 Primary Diagnosis: <principal problem not specified> Admission Status: Voluntary Readmission within 30 days: no 
Power of  in place: no 
Patient requires a blocked bed: no          Reason for blocked bed: VTE Prophylaxis: No 
 
Mobility needs/Fall risk: no 
Flu Vaccine : yes Nutritional Plan: no 
Consults:       
Labs/Testing due today?: no 
 
Sleep hours:  7 1/2 Participation in Care/Groups:  yes Medication Compliant?: Yes PRNS (last 24 hours): Antianxiety and Sleep Aid Restraints (last 24 hours):  no 
  
CIWA (range last 24 hours): COWS (range last 24 hours): Alcohol screening (AUDIT) completed -   AUDIT Score: 2 If applicable, date SBIRT discussed in treatment team AND documented:  
AUDIT Screen Score: AUDIT Score: 2 Tobacco - patient is a smoker: Have You Used Tobacco in the Past 30 Days: No 
Illegal Drugs use: Have You Used Any Illegal Substances Over the Past 12 Months: No 
 
24 hour chart check complete:yes Patient goal(s) for today: Attend all groups Treatment team focus/goals: Schedule follow-up Progress note: Patient extremely defensive and feels easily victimized; states she thinks she is doing better LOS:  2  Expected LOS: 3-4 Financial concerns/prescription coverage: [employer] Date of last family contact: None Family requesting physician contact today: No 
Discharge plan: Return home Guns in the home: No     
Outpatient provider(s): To be linked Participating treatment team members: Marito Brumfield MSW; Kavin Alfaro NP; Carmen Vasquez RN
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
## 2019-04-08 NOTE — BH NOTES
PRN Medication Documentation 
@2684 Specific patient behavior that led to need for PRN medication: itching Staff interventions attempted prior to PRN being given: distraction PRN medication given: atarax 50 mg PO and Cortaid cream 
Patient response/effectiveness of PRN medication: pending 
 
 
@7005 patient reports that the itching has subsided

## 2019-04-08 NOTE — PROGRESS NOTES
Problem: Depressed Mood (Adult/Pediatric) Goal: *STG: Participates in treatment plan Outcome: Progressing Towards Goal 
Goal: *STG: Verbalizes anger, guilt, and other feelings in a constructive manor Outcome: Progressing Towards Goal 
Goal: *STG: Attends activities and groups Outcome: Progressing Towards Goal 
 
Patient both med/meal compliant

## 2019-04-08 NOTE — BH NOTES
PRN Medication Documentation Specific patient behavior that led to need for PRN medication: Pt requested sleep aid. Staff intervention attempted prior to administration: Therapeutic listening PRN Medication given: Trazodone 50 mg Patient response/effectiveness to PRN: Pt in bed resting

## 2019-04-08 NOTE — PROGRESS NOTES
GROUP THERAPY PROGRESS NOTE Gisel Reid is participating in [place]. Group time: 15 minutes Personal goal for participation: Reflection Goal orientation: personal 
 
Group therapy participation: passive Therapeutic interventions reviewed and discussed: Gratefulness Impression of participation: actively listening

## 2019-04-08 NOTE — BSMART NOTE
GROUP THERAPY PROGRESS NOTE Pepito Chaparro is participating in Substance abuse. Group time: 45 minutes Personal goal for participation: To understand addiction, criteria for diagnosis, and identify triggers and coping skills. Goal orientation: personal 
 
Group therapy participation: passive Therapeutic interventions reviewed and discussed: Group discussion of substance use, abuse, and dependence and the DSM 5 criteria for a substance use disorder. Patients were able to self-rate themselves based on the 11 criteria for s substance use disorder and explore their own level of addiction for cigarettes, alcohol, heroin, and other substances. Group discussed how they feel when they are unable to use and ways substance use has hindered their lives. Triggers for use and coping skills to avoid use or manage symptoms until craving subsides were discussed. Impression of participation: Patient was observed quietly observing and then appeared to go to sleep during discussion.  
 
Alessandro Serrano [facility]

## 2019-04-08 NOTE — PROGRESS NOTES
Problem: Depressed Mood (Adult/Pediatric) Goal: *STG: Remains safe in hospital 
Outcome: Progressing Towards Goal 
 Lying quietly in bed with eyes closed, respirations even and unlabored , NAD noted Q15 min safety monitoring continues

## 2019-04-09 VITALS
HEIGHT: 66 IN | WEIGHT: 241.2 LBS | TEMPERATURE: 97.9 F | SYSTOLIC BLOOD PRESSURE: 122 MMHG | OXYGEN SATURATION: 99 % | BODY MASS INDEX: 38.76 KG/M2 | DIASTOLIC BLOOD PRESSURE: 84 MMHG | RESPIRATION RATE: 16 BRPM | HEART RATE: 62 BPM

## 2019-04-09 PROCEDURE — 74011250637 HC RX REV CODE- 250/637

## 2019-04-09 PROCEDURE — 74011250637 HC RX REV CODE- 250/637: Performed by: NURSE PRACTITIONER

## 2019-04-09 RX ORDER — CEFUROXIME AXETIL 250 MG/1
250 TABLET ORAL 2 TIMES DAILY
Qty: 9 TAB | Refills: 0 | Status: SHIPPED | OUTPATIENT
Start: 2019-04-09 | End: 2019-04-14

## 2019-04-09 RX ORDER — HYDROCORTISONE 1 %
CREAM (GRAM) TOPICAL
Qty: 30 G | Refills: 0 | Status: SHIPPED | OUTPATIENT
Start: 2019-04-09

## 2019-04-09 RX ORDER — SERTRALINE HYDROCHLORIDE 50 MG/1
50 TABLET, FILM COATED ORAL DAILY
Qty: 30 TAB | Refills: 0 | Status: SHIPPED | OUTPATIENT
Start: 2019-04-10 | End: 2021-04-02

## 2019-04-09 RX ORDER — HYDROXYZINE 50 MG/1
50 TABLET, FILM COATED ORAL
Qty: 60 TAB | Refills: 0 | Status: SHIPPED | OUTPATIENT
Start: 2019-04-09 | End: 2019-04-19

## 2019-04-09 RX ADMIN — HYDROXYZINE HYDROCHLORIDE 50 MG: 50 TABLET, FILM COATED ORAL at 09:02

## 2019-04-09 RX ADMIN — CEFUROXIME AXETIL 250 MG: 250 TABLET ORAL at 09:00

## 2019-04-09 RX ADMIN — SERTRALINE HYDROCHLORIDE 50 MG: 50 TABLET ORAL at 09:00

## 2019-04-09 RX ADMIN — HYDROCORTISONE: 1 CREAM TOPICAL at 09:01

## 2019-04-09 NOTE — INTERDISCIPLINARY ROUNDS
Behavioral Health Interdisciplinary Rounds Patient Name: Charu Matt  Age: 25 y.o. Room/Bed:  727/02 Primary Diagnosis: <principal problem not specified> Admission Status: Voluntary Readmission within 30 days: no 
Power of  in place: no 
Patient requires a blocked bed: no          Reason for blocked bed: VTE Prophylaxis: Not indicated Mobility needs/Fall risk: no 
Flu Vaccine : yes Nutritional Plan: no 
Consults:         
Labs/Testing due today?: no 
 
Sleep hours: 6 Participation in Care/Groups:  yes Medication Compliant?: Yes PRNS (last 24 hours): Antianxiety Restraints (last 24 hours):  no 
  
CIWA (range last 24 hours): COWS (range last 24 hours): Alcohol screening (AUDIT) completed -   AUDIT Score: 2 If applicable, date SBIRT discussed in treatment team AND documented:  
AUDIT Screen Score: AUDIT Score: 2 Tobacco - patient is a smoker: Have You Used Tobacco in the Past 30 Days: No 
Illegal Drugs use: Have You Used Any Illegal Substances Over the Past 12 Months: No 
 
24 hour chart check complete: yes Patient goal(s) for today:Discharge Treatment team focus/goals: Schedule follow-up; discharge Progress note: Pt very talkative, attending groups; states she is engaging with peers LOS:  3  Expected LOS: 3 Financial concerns/prescription coverage: [employer] Date of last family contact: None Family requesting physician contact today: No  
Discharge plan: Return home Guns in the home: No       
Outpatient provider(s): Refer to [address] [place] [address] Participating treatment team members: Charu Matt, ANGY Lang; Lucy Smith NP; Jen Ruelas RN

## 2019-04-09 NOTE — BH NOTES
PRN Medication Documentation Specific patient behavior that led to need for PRN medication: itching Staff interventions attempted prior to PRN being given: emotional support PRN medication given: Cortaid cream @ 2047 Patient response/effectiveness of PRN medication: relief in symptoms PRN Medication Documentation Specific patient behavior that led to need for PRN medication: itching Staff interventions attempted prior to PRN being given: emotional support PRN medication given: Atarax 50mg PO @ 2529 Patient response/effectiveness of PRN medication: relief in symptoms

## 2019-04-09 NOTE — BH NOTES
GROUP THERAPY PROGRESS NOTE Leida Galdamez participated in a morning Process Group on the General Unit with a focus identifying feelings, planning for the day, and learning more about DBT concepts on \"Emotion Regulation. \" . 
Group time: 80 minutes. Personal goal for participation: To increase the capacity to improve ones mood, set personal goals, and understand more about basic activities to help regulate emotions. Goal orientation: The patients will be able to identify their feelings 
and develop a plan for structuring their day. They were also 
presented with a summary sheet on emotional regulation,  
in regards to focusing on one goal per day, taking physical care of 
oneself, and recognizing and/or building positive experiences. The 
didactic portion of the session focused on these three concepts:  
1) defining and focusing on one goal per day;  
2) taking care of ones physical maintenance and basic needs  
sleep, nutrition, and exercise; and  
3) finding and building on positive experiences. Group therapy participation: With prompting, this patient actively participated in the group. Therapeutic interventions reviewed and discussed: The group 
members were asked to identify an emotion they are having and/or 
let the group know what they want to focus on for the day as they 
continue to make discharge plans. The group members reviewed 
three DBT suggestions regarding emotional regulation, in regards 
to focusing on one goal per day, taking physical care of oneself, 
and recognizing and/or building positive experiences. It was  
suggested that these three concepts can be seen as headings for 
their list of coping skills. The group members were also provided 
worksheets on the topic discussed for their review and use on  
their own time. Impression of participation: The patient said she was, \"Feeling better, after deciding to move beyond my job. \"  She was alert and generally oriented. She expressed no current SI/HI and displayed no overt psychotic symptoms in this group. 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 Her affect was non-dysphoric and her mood was hopeful. She is not as depressed as she was when she first entered the hospital. She appeared to be focused on finalizing her discharge plans with nursing.

## 2019-04-09 NOTE — PROGRESS NOTES
Problem: Discharge Planning Goal: *Discharge to safe environment Outcome: Progressing Towards Goal 
Note:  
Patient identifies home as a safe environment. Patient will return home upon discharge. Goal: *Knowledge of medication management Outcome: Progressing Towards Goal 
Note:  
Patient verbalizes understanding of medication regimen. Patient is taking medications as prescribed. Goal: *Knowledge of discharge instructions Outcome: Progressing Towards Goal 
Note:  
Patient verbalizes understanding of goals for treatment and safe discharge.

## 2019-04-09 NOTE — BH NOTES
Behavioral Health Transition Record to Provider Patient Name: Keke Anthony YOB: 1995 Medical Record Number: [id number] Date of Admission: 4/5/2019 Date of Discharge: 4/9/2019 Attending Provider: Fatimah Robertson NP Discharging Provider: Fatimah Robertson NP To contact this individual call [phone] and ask the  to page. If unavailable, ask to be transferred to [facility] Provider on call. [facility] Provider will be available on call 24/7 and during holidays. Primary Care Provider: None Allergies Allergen Reactions  Latex Hives Reason for Admission: Pt admitted under a voluntary basis for severe depression with suicidal ideations proving to be an imminent danger to self and an inability to care for self. Admission Diagnosis: Depressive disorder [F32.9] * No surgery found * Results for orders placed or performed during the hospital encounter of 04/05/19 CULTURE, URINE Result Value Ref Range Special Requests: NO SPECIAL REQUESTS Reflexed from [id number] [place] Count >100,000 COLONIES/mL Culture result: ESCHERICHIA COLI (A) Susceptibility Escherichia coli - BRO Amikacin ($) <=16 Susceptible ug/mL Ampicillin ($) >16 Resistant ug/mL Ampicillin/sulbactam ($) >16/8 Resistant ug/mL Aztreonam ($$$$) <=4 Susceptible ug/mL Cefazolin ($) <=8 Susceptible ug/mL Cefepime ($$) <=4 Susceptible ug/mL Cefotaxime <=2 Susceptible ug/mL Ceftazidime ($) <=1 Susceptible ug/mL Ceftriaxone ($) <=1 Susceptible ug/mL Cefuroxime ($) 8 Susceptible ug/mL Ciprofloxacin ($) <=1 Susceptible ug/mL Gentamicin ($) <=4 Susceptible ug/mL Imipenem <=1 Susceptible ug/mL Levofloxacin ($) <=2 Susceptible ug/mL Meropenem ($$) <=1 Susceptible ug/mL Nitrofurantoin <=32 Susceptible ug/mL Piperacillin/Tazobac ($) <=16 Susceptible ug/mL Tobramycin ($) <=4 Susceptible ug/mL Trimeth/Sulfa <=2/38 Susceptible ug/mL CBC WITH AUTOMATED DIFF Result Value Ref Range WBC 9.6 3.6 - 11.0 K/uL  
 RBC 3.93 3.80 - 5.20 M/uL  
 HGB 12.1 11.5 - 16.0 g/dL HCT 36.0 35.0 - 47.0 % MCV 91.6 80.0 - 99.0 FL  
 MCH 30.8 26.0 - 34.0 PG  
 MCHC 33.6 30.0 - 36.5 g/dL  
 RDW 11.9 11.5 - 14.5 % PLATELET 366 297 - 648 K/uL MPV 11.0 8.9 - 12.9 FL  
 NRBC 0.0 0  WBC ABSOLUTE NRBC 0.00 0.00 - 0.01 K/uL NEUTROPHILS 66 32 - 75 % LYMPHOCYTES 26 12 - 49 % MONOCYTES 6 5 - 13 % EOSINOPHILS 1 0 - 7 % BASOPHILS 0 0 - 1 % IMMATURE GRANULOCYTES 1 (H) 0.0 - 0.5 % ABS. NEUTROPHILS 6.4 1.8 - 8.0 K/UL  
 ABS. LYMPHOCYTES 2.5 0.8 - 3.5 K/UL  
 ABS. MONOCYTES 0.6 0.0 - 1.0 K/UL  
 ABS. EOSINOPHILS 0.1 0.0 - 0.4 K/UL  
 ABS. BASOPHILS 0.0 0.0 - 0.1 K/UL  
 ABS. IMM. GRANS. 0.1 (H) 0.00 - 0.04 K/UL  
 DF AUTOMATED METABOLIC PANEL, COMPREHENSIVE Result Value Ref Range Sodium 140 136 - 145 mmol/L Potassium 3.8 3.5 - 5.1 mmol/L Chloride 110 (H) 97 - 108 mmol/L  
 CO2 24 21 - 32 mmol/L Anion gap 6 5 - 15 mmol/L Glucose 93 65 - 100 mg/dL BUN 12 6 - 20 MG/DL Creatinine 0.70 0.55 - 1.02 MG/DL  
 BUN/Creatinine ratio 17 12 - 20 GFR est AA >60 >60 ml/min/1.73m2 GFR est non-AA >60 >60 ml/min/1.73m2 Calcium 9.3 8.5 - 10.1 MG/DL Bilirubin, total 1.3 (H) 0.2 - 1.0 MG/DL  
 ALT (SGPT) 27 12 - 78 U/L  
 AST (SGOT) 24 15 - 37 U/L Alk. phosphatase 89 45 - 117 U/L Protein, total 7.6 6.4 - 8.2 g/dL Albumin 4.0 3.5 - 5.0 g/dL Globulin 3.6 2.0 - 4.0 g/dL A-G Ratio 1.1 1.1 - 2.2 ETHYL ALCOHOL Result Value Ref Range ALCOHOL(ETHYL),SERUM <10 <10 MG/DL  
DRUG SCREEN, URINE Result Value Ref Range AMPHETAMINES NEGATIVE  NEG    
 BARBITURATES NEGATIVE  NEG BENZODIAZEPINES NEGATIVE  NEG    
 COCAINE NEGATIVE  NEG  METHADONE NEGATIVE  NEG    
 OPIATES NEGATIVE  NEG    
 PCP(PHENCYCLIDINE) NEGATIVE  NEG    
 THC (TH-CANNABINOL) NEGATIVE  NEG Drug screen comment (NOTE) URINALYSIS W/ REFLEX CULTURE Result Value Ref Range Color DARK YELLOW Appearance CLOUDY (A) CLEAR Specific gravity 1.027 1.003 - 1.030    
 pH (UA) 5.5 5.0 - 8.0 Protein TRACE (A) NEG mg/dL Glucose NEGATIVE  NEG mg/dL Ketone TRACE (A) NEG mg/dL Bilirubin NEGATIVE  NEG Blood TRACE (A) NEG Urobilinogen 1.0 0.2 - 1.0 EU/dL Nitrites POSITIVE (A) NEG Leukocyte Esterase SMALL (A) NEG    
 WBC 0-4 0 - 4 /hpf  
 RBC 0-5 0 - 5 /hpf Epithelial cells FEW FEW /lpf Bacteria 4+ (A) NEG /hpf  
 UA:UC IF INDICATED URINE CULTURE ORDERED (A) CNI    
SALICYLATE Result Value Ref Range Salicylate level <4.3 (L) 2.8 - 20.0 MG/DL  
ACETAMINOPHEN Result Value Ref Range Acetaminophen level <2 (L) 10 - 30 ug/mL TSH 3RD GENERATION Result Value Ref Range TSH 0.64 0.36 - 3.74 uIU/mL  
HCG URINE, QL. - POC Result Value Ref Range Pregnancy test,urine (POC) NEGATIVE  NEG Immunizations administered during this encounter: There is no immunization history on file for this patient. Screening for Metabolic Disorders for Patients on Antipsychotic Medications 
(Data obtained from the EMR) Estimated Body Mass Index Estimated body mass index is 38.93 kg/m² as calculated from the following: 
  Height as of this encounter: 5' 6\" (1.676 m). Weight as of this encounter: 109.4 kg (241 lb 3.2 oz). Vital Signs/Blood Pressure Visit Vitals /84 Pulse 62 Temp 97.9 °F (36.6 °C) Resp 16 Ht 5' 6\" (1.676 m) Wt 109.4 kg (241 lb 3.2 oz) SpO2 99% Breastfeeding? No  
BMI 38.93 kg/m² Blood Glucose/Hemoglobin A1c Lab Results Component Value Date/Time Glucose 93 04/05/2019 11:37 PM  
 
No results found for: HBA1C, HGBE8, [id number] Lipid Panel No results found for: CHOL, CHOLX, CHLST, [address], [id number], HDL, LDL, LDLC, DLDLP, TGLX, TRIGL, TRIGP, CHHD, CHHDX Discharge Diagnosis: Depressive disorder (ICD-10-CM: F32.9) Discharge Plan: Patient discharged home into the care of a friend. DISCHARGE Lillian Arevalo : 1995 MRN: [id number] The patient Winsome Dutta exhibits the ability to control behavior in a less restrictive environment. Patient's level of functioning is improving. No assaultive/destructive behavior has been observed for the past 24 hours. No suicidal/homicidal threat or behavior has been observed for the past 24 hours. There is no evidence of serious medication side effects. Patient has not been in physical or protective restraints for at least the past 24 hours. If weapons involved, how are they secured? No weapons involved. Is patient aware of and in agreement with discharge plan? Yes Arrangements for medication:  Prescriptions given to patient. Copy of discharge instructions to provider?:  [address] [place], [place] [address] Arrangements for transportation home:  Friend to . Keep all follow up appointments as scheduled, continue to take prescribed medications per physician instructions. Mental health crisis number:  553 or your local mental health crisis line number at [phone]. Discharge Medication List and Instructions:  
Discharge Medication List as of 2019 12:38 PM  
  
START taking these medications Details  
cefUROXime (CEFTIN) 250 mg tablet Take 1 Tab by mouth two (2) times a day for 9 doses. Indications: Bacterial Urinary Tract Infection, Print, Disp-9 Tab, R-0  
  
hydrocortisone (CORTAID) 1 % topical cream Apply  to affected area three (3) times daily as needed for Other (itching). use thin layer  Indications: Atopic Dermatitis, Print, Disp-30 g, R-0  
  
hydrOXYzine HCl (ATARAX) 50 mg tablet Take 1 Tab by mouth every six (6) hours as needed for Itching for up to 10 days.  Indications: anxious, Print, Disp-60 Tab, R-0  
  
 sertraline (ZOLOFT) 50 mg tablet Take 1 Tab by mouth daily. Indications: Unspecified Mood Disorder, Print, Disp-30 Tab, R-0  
  
  
STOP taking these medications  
  
 triamcinolone acetonide (KENALOG) 0.025 % topical cream Comments:  
Reason for Stopping:   
   
  
 
 
Unresulted Labs (24h ago, onward) None To obtain results of studies pending at discharge, please contact [phone] Follow-up Information Follow up With Specialties Details Why Contact Info Cash Russ on 4/10/2019 Walk-in Monday through Wednesday from 7:30am to 3:00pm, Thursday between 7:30am and 5:30pm, or Friday between 7:30am and 11:00am to enroll in mental health follow-up treatment. Remember to bring your photo ID and insurance card. [place] [address] [address] [facility], [address] [place] [address] 
[phone] None    None ([phone]) Patient stated that they have no PCP Advanced Directive:  
Does the patient have an appointed surrogate decision maker? No 
Does the patient have a Medical Advance Directive? No 
Does the patient have a Psychiatric Advance Directive? No 
If the patient does not have a surrogate or Medical Advance Directive AND Psychiatric Advance Directive, the patient was offered information on these advance directives Yes and Patient declined to complete Patient Instructions: Please continue all medications until otherwise directed by physician. Tobacco Cessation Discharge Plan:  
Is the patient a smoker and needs referral for smoking cessation? No 
Patient referred to the following for smoking cessation with an appointment? Not applicable Patient was offered medication to assist with smoking cessation at discharge? Not applicable Was education for smoking cessation added to the discharge instructions? Yes Alcohol/Substance Abuse Discharge Plan:  
Does the patient have a history of substance/alcohol abuse and requires a referral for treatment?  No 
 Patient referred to the following for substance/alcohol abuse treatment with an appointment? Not applicable Patient was offered medication to assist with alcohol cessation at discharge? Not applicable Was education for substance/alcohol abuse added to discharge instructions? No 
 
Patient discharged to Home; discussed with patient/caregiver and provided to the patient/caregiver either in hard copy or electronically.
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
## 2019-04-09 NOTE — PROGRESS NOTES
Problem: Falls - Risk of 
Goal: *Absence of Falls Description Document Cl[place] Failing Fall Risk and appropriate interventions in the flowsheet. Outcome: Progressing Towards Goal 
Note:  
Fall Risk Interventions: 
  
 
  
 
Medication Interventions: Teach patient to arise slowly

## 2019-04-09 NOTE — PROGRESS NOTES
Problem: Depressed Mood (Adult/Pediatric) Goal: *STG: Participates in treatment plan 4/9/2019 1135 by Carol Ann Leon RN Outcome: Progressing Towards Goal 
4/9/2019 1133 by Carol Ann Leon RN Outcome: Progressing Towards Goal 
Note: Out on unit social w peers and staff. Mood and affect brigher smiling and engaged. Denies SI, no self harming behaviors. Demonstrates appropriate behaviors and ability to follow staff direction and unit routine. Daily goal is to discuss d/c date. Staff focus is on d/c planning Goal: *STG: Verbalizes anger, guilt, and other feelings in a constructive manor Outcome: Progressing Towards Goal 
Goal: *STG: Attends activities and groups Outcome: Progressing Towards Goal 
Goal: *STG: Demonstrates reduction in symptoms and increase in insight into coping skills/future focused Outcome: Progressing Towards Goal 
Goal: Interventions Outcome: Progressing Towards Goal

## 2019-04-09 NOTE — BSMART NOTE
GROUP THERAPY PROGRESS NOTE Abiodun Oneill is participating in Cognitive Behavior Therapy Group. Group time: 1 hour Personal goal for participation: To understand the cognitive triangle, identify cognitive distortions, and learn how to change distortions to positive thoughts and affirmations. Goal orientation: personal 
 
Group therapy participation: active Therapeutic interventions reviewed and discussed: The cognitive triangle (thoughts, feelings, behavior), cognitive distortions, and positive thoughts and affirmations. Impression of participation: Maciej Lemus was an active participant in group. She was alert, oriented, and cooperative. She was able to understand concepts in group and apply them to her situations in most cases. She was talkative and shared that work and other life stressors brought her to the hospital.  She appeared to have minimal insight into how to cope when she is overwhelmed but verbalized a desire to change her situation for the better.

## 2019-04-09 NOTE — DISCHARGE INSTRUCTIONS
DISCHARGE SUMMARY    NAME:Kamar Gonzalez  : 1995  MRN: [id number]    The patient Ivan Ibarra exhibits the ability to control behavior in a less restrictive environment. Patient's level of functioning is improving. No assaultive/destructive behavior has been observed for the past 24 hours. No suicidal/homicidal threat or behavior has been observed for the past 24 hours. There is no evidence of serious medication side effects. Patient has not been in physical or protective restraints for at least the past 24 hours. If weapons involved, how are they secured? No weapons involved. Is patient aware of and in agreement with discharge plan? Yes    Arrangements for medication:  Prescriptions given to patient. Copy of discharge instructions to provider?:  [address] for transportation home:  Friend to . Keep all follow up appointments as scheduled, continue to take prescribed medications per physician instructions. Mental health crisis number:  [phone] or your local mental health crisis line number at [phone]. DISCHARGE SUMMARY from Nurse    PATIENT INSTRUCTIONS:    What to do at Home:  Recommended activity: Activity as tolerated,     If you experience any of the following symptoms thoughts of harming self, feeling overwhelmed with anxiety, hopelessness and sadness, please follow up with your local crisis number at [phone]    *  Please give a list of your current medications to your Primary Care Provider. *  Please update this list whenever your medications are discontinued, doses are      changed, or new medications (including over-the-counter products) are added. *  Please carry medication information at all times in case of emergency situations.     These are general instructions for a healthy lifestyle:    No smoking/ No tobacco products/ Avoid exposure to second hand smoke  Surgeon General's Warning:  Quitting smoking now greatly reduces serious risk to your health. Obesity, smoking, and sedentary lifestyle greatly increases your risk for illness    A healthy diet, regular physical exercise & weight monitoring are important for maintaining a healthy lifestyle    You may be retaining fluid if you have a history of heart failure or if you experience any of the following symptoms:  Weight gain of 3 pounds or more overnight or 5 pounds in a week, increased swelling in our hands or feet or shortness of breath while lying flat in bed. Please call your doctor as soon as you notice any of these symptoms; do not wait until your next office visit. Recognize signs and symptoms of STROKE:    F-face looks uneven    A-arms unable to move or move unevenly    S-speech slurred or non-existent    T-time-call 911 as soon as signs and symptoms begin-DO NOT go       Back to bed or wait to see if you get better-TIME IS BRAIN. Warning Signs of HEART ATTACK     Call 911 if you have these symptoms:   Chest discomfort. Most heart attacks involve discomfort in the center of the chest that lasts more than a few minutes, or that goes away and comes back. It can feel like uncomfortable pressure, squeezing, fullness, or pain.  Discomfort in other areas of the upper body. Symptoms can include pain or discomfort in one or both arms, the back, neck, jaw, or stomach.  Shortness of breath with or without chest discomfort.  Other signs may include breaking out in a cold sweat, nausea, or lightheadedness. Don't wait more than five minutes to call 911 - MINUTES MATTER! Fast action can save your life. Calling 911 is almost always the fastest way to get lifesaving treatment. Emergency Medical Services staff can begin treatment when they arrive -- up to an hour sooner than if someone gets to the hospital by car. The discharge information has been reviewed with the patient. The patient verbalized understanding.   Discharge medications reviewed with the patient and appropriate educational materials and side effects teaching were provided.   ___________________________________________________________________________________________________________________________________

## 2021-04-02 ENCOUNTER — VIRTUAL VISIT (OUTPATIENT)
Dept: FAMILY MEDICINE CLINIC | Age: 26
End: 2021-04-02
Payer: COMMERCIAL

## 2021-04-02 DIAGNOSIS — Z76.89 ENCOUNTER TO ESTABLISH CARE WITH NEW DOCTOR: Primary | ICD-10-CM

## 2021-04-02 DIAGNOSIS — F90.9 ATTENTION DEFICIT HYPERACTIVITY DISORDER (ADHD), UNSPECIFIED ADHD TYPE: ICD-10-CM

## 2021-04-02 PROCEDURE — 99203 OFFICE O/P NEW LOW 30 MIN: CPT | Performed by: FAMILY MEDICINE

## 2021-04-02 RX ORDER — BUPROPION HYDROCHLORIDE 150 MG/1
TABLET ORAL
COMMUNITY
Start: 2021-02-16

## 2021-04-02 RX ORDER — QUETIAPINE FUMARATE 25 MG/1
50 TABLET, FILM COATED ORAL
COMMUNITY
Start: 2021-02-16

## 2021-04-02 RX ORDER — HYDROXYZINE PAMOATE 50 MG/1
50 CAPSULE ORAL
COMMUNITY
Start: 2021-02-16
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
## 2023-03-31 ENCOUNTER — HOSPITAL ENCOUNTER (OUTPATIENT)
Dept: BEHAVIORAL/MENTAL HEALTH | Age: 28
End: 2023-03-31

## 2023-03-31 PROCEDURE — 90853 GROUP PSYCHOTHERAPY: CPT
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
## 2023-03-31 NOTE — GROUP NOTE
IP  GROUP DOCUMENTATION INDIVIDUAL                                                                          Group Therapy Note    Date: 3/31/2023    Group Start Time:  1:10 PM  Group End Time:  2:00 PM  Group Topic: Recreational/Music Therapy    [place] [address] ACUTE BEHAV TH    [place] Christian    IP [address]150 [facility] GROUP DOCUMENTATION GROUP    Group Therapy Note    Patients were asked to paint canvases while thinking about a \"dream.\" \"Dream\" was explained as a dream they had while they were asleep or as an aspiration they have for themselves in the future. Patients were encouraged to engaged with each other while painting. Patients were encouraged to give music suggestions to listen to while painting. Attendees: 8       Attendance: Did not attend    Patient's Goal:  Did not attend    Interventions/techniques: Other Did not attend    Follows Directions: Other Did not attend    Interactions: Other Did not attend    Mental Status: Other Did not attend    Behavior/appearance: Did not attend    Goals Achieved: Did not attend      Additional Notes: The patient met individually with Nurse Sotero Doty and with a  for the duration of group.     Sharon Fiore, BSW, MSW Student

## 2023-03-31 NOTE — BH NOTES
SAFETY PLAN    A suicide Safety Plan is a document that supports someone when they are having thoughts of suicide. Warning Signs that indicate a suicidal crisis may be developing: What (situations, thoughts, feelings, body sensations, behaviors, etc.) do you experience that lets you know you are beginning to think about suicide? 1. Have meltdowns, can't self-regulate  2. \"Don't give a 'F '  3. tearful    Internal Coping Strategies:  What things can I do (relaxation techniques, hobbies, physical activities, etc.) to take my mind off my problems without contacting another person? 1. Th ink about responsibilities, \"logic my way through it\"  2. Think about not wanting to leave the financial burden on family  3. Fidget    People and social settings that provide distraction: Who can I call or where can I go to distract me? 1. Name: Amadou Yañez  Phone: [phone]  2. Name: Janet Mosquera  Phone: [phone]   3. Place: sleep in bed            4. Place: go to [facility], go outside    People whom I can ask for help: Who can I call when I need help - for example, friends, family, clergy, someone else? 1. Name:                 Phone:   2. Name:   Phone:   3. Name:   Phone:     Professionals or [address] [place] [address] I can contact during a crisis: Who can I call for help - for example, my doctor, my psychiatrist, my psychologist, a mental health provider, a suicide hotline? 1. Clinician Name: Martin Wesley   Phone: [phone]      Clinician Pager or Emergency Contact #: [phone]    5. Clinician Name: access   Phone: [phone]      Clinician Pager or Emergency Contact #: [phone]    1. Suicide Prevention Lifeline: 0-951-661-TALK (2386)    4. [address] [place] [address] Emergency Services -  for example, [facility] suicide hotline, [facility]        Emergency Services Address:       Emergency Services Phone: [phone]    Making the environment safe:  How can I make my environment (house/apartment/living space) safer? For example, can I remove guns, medications, and other items? 1. Post safety plan in house where visible  2.  Vic Julio to secure firearm

## 2023-03-31 NOTE — H&P
History and Physical    Chief Complaint:    History of Present Illness: ***    Past Medical History:   Diagnosis Date    ADHD     Eczema     STEPAN (generalized anxiety disorder)     HPV in female     MDD (major depressive disorder)       Past Surgical History:   Procedure Laterality Date    HX TONSIL AND ADENOIDECTOMY      HX WISDOM TEETH EXTRACTION         Social History     Tobacco Use    Smoking status: Never    Smokeless tobacco: Never   Substance Use Topics    Alcohol use: Yes     Comment: former heavy drinker      Family History   Problem Relation Age of Onset    Stroke Father     Other Father         rheumatic fever    Breast Cancer Maternal Grandmother     Heart Attack Paternal Grandmother     Stroke Paternal Grandmother         Allergies   Allergen Reactions    Latex Hives    Lexapro [Escitalopram] Other (comments)     suicidal    Nickel Rash       Medications: ***    Review of Systems (Check normal or all that currently apply):  GENERAL [] Normal    []Abnormal weight loss  []Abnormal weight gain  []Fever  []Fatigue  []Chills/Sweats  []Other:    HEENT  [] Normal    []Headache  []Blurred vision  []Tinnitus  []Dizziness  []Epistaxis  []Other:  CV  [] Normal    []Chest  []Palpitations  []Murmur  []PND/orthopnea  []LE swelling  []Other:  PULM [] Normal    []Wheezing  []Cough  []SOB  []Hemoptysis  []Sputum  []Other:  GYN/URO [] Normal    []Dysuria  []Frequency  []Urgency  []Hematuria  []Pelvic pain  []Other:    NEURO  [] Normal    []Migraine  []Seizure  []TIA/Stroke  []Weakness/syncope  []Sensory impairment  []Other:  GI   [] Normal    []JABARI  []Nausea/Vomiting  []Diarrhea/constipation  []Bowel habit changes  []Melena  []Other:  MS  [] Normal    []Joint swelling/pain  []Limitations/Neck mobility  []Gait difficulty  []Deformity  []Prosthetic devices  []Other:  ENDO  [] Normal    []Hair loss  []Excessive sweat  []Excessive thirst  []Heat intolerance  []Cold intolerance  []Other:  HEME/LYMPH [] Normal    []Bleeding tendency  []DVT history  []Enlarged nodes  []Immunosuppression  []Recent steroid use  []Other:      Details for Pertinent Positives:   Has a pending assault and battery charge on April 19, 2019. Mental Status Exam Findings: ***  General appearance:   Nory Castaneda is a 29 y.o. BLACK/ female who is appropriately groomed, psychomotor activity is WNL  Eye contact: makes good eye contact  Speech: Spontaneous and coherent  Affect : Euthymic  Mood: \"OK\"  Thought Process: Logical, goal directed  Perception: Denies any AH or VH. Thought Content: Denies any SI or Plan  Insight: Partial  Judgement: Fair  Cognition: Intact grossly. Impression/Initial Diagnosis: ***    Course of Action/Treatment Plan: ***  Continue current medications. Continue PHP.     Annmarie Ahumada NP  3/31/2023  1:27 PM

## 2023-03-31 NOTE — GROUP NOTE
IP  GROUP DOCUMENTATION INDIVIDUAL                                                                          Group Therapy Note    Date: 3/31/2023    Group Start Time: 12:20 PM  Group End Time:  1:10 PM  Group Topic: Psychological Group    [place] [address] ACUTE BEHAV HLTH    Franklin Mirian    IP [address] [facility] GROUP DOCUMENTATION GROUP    Group Therapy Note    The patient were provided information on relationship green flags. The patients were encouraged to discuss relationships in their lives and to provide peers with support, feedback and advice if appropriate. The patients were encouraged to participate in an ice breaker exercise to welcome a new peer. Attendees: 8       Attendance: Attended    Patient's Goal:  discuss relationships and get to know peers    Interventions/techniques: Informed and Promoted peer support    Follows Directions: Followed directions    Interactions: Interacted appropriately    Mental Status: Calm and Congruent    Behavior/appearance: Attentive, Cooperative, and Motivated    Goals Achieved: Able to engage in interactions, Able to listen to others, and Able to self-disclose      Additional Notes: The patient was receptive to information on relationship green flags. The patient participated in group conversation on relationships. The patient participated in ice breaker exercise. The patient answered ice breaker questions and listened to peers self-disclose. The patient will continue to discuss relationship group a group.     Ronald Reza

## 2023-04-03 ENCOUNTER — HOSPITAL ENCOUNTER (OUTPATIENT)
Dept: BEHAVIORAL/MENTAL HEALTH | Age: 28
End: 2023-04-03
Payer: COMMERCIAL

## 2023-04-03 PROCEDURE — 90834 PSYTX W PT 45 MINUTES: CPT

## 2023-04-03 PROCEDURE — 90853 GROUP PSYCHOTHERAPY: CPT

## 2023-04-03 NOTE — BH NOTES
OUTPATIENT PHYSICIAN PROGRESS NOTE      Chief Complaint/Symptoms/Impairments (as noted in Treatment Plan): Unspecified mood disorder. Criteria for Continued Treatment (check all that apply):   [x] Preventing Decompensation   [x] Improving Level of Functioning  [] Reducing Isolative Behaviors  [] Understanding Diagnosis and Need for Medications  [] Improving Treatment/Medication Compliance  [] Confronting Denial of Illness  [x] Stabilizing Level of Functioning  [] Improving Emotional/Social/Cognitive Functioning  [] Decreasing Frequency of Hospitalizations    Suicidal/Homicidal ideations:   [x] Absent  [] Present  [] Passive  [] Intent  [] Plan  [] Death Wishes      CURRENT CONDITION OF PATIENT & PROGRESS ON TREATMENT PLAN    Subjective (ongoing complaints by patient regarding symptom severity, presentation, affect, function, etc.):     Conchita Ma started taking wellbutrin xl 150 mg over the weekend, denies side effects. Sleep is not the best, she notes racing thoughts at night and some anxiety. She is aware that wellbutrin can aggravate anxiety. She was previously on seroquel and it seemed to help. She would like to be started back on it. She feels the groups have been helping but \"exhausting. \"       Behavior (side effects, changes in mental status, objective observations seen in individual sessions or by staff):     Calm and pleasant. Appropriately interactive. Denies si hi or avh. No agitation or aggression. Assessment/Plan (functional improvement and/or ongoing impairment, response to treatment, plan for future ongoing treatment and medication management to include revisions):     Continue wellbutrin xl 150 mg. Start seroquel 50 mg qhs. Consider atarax prn. Continue rest of meds. Group and milieu therapy. Continue php.    [] NO NEW Medications at this time.    [x] New Medication prescribed and prescription provided to patient  [x] PHP staff notified of changes as needed    [x] Regarding any medications: patient instructed on purpose, benefits, side effects,   risks, and alternative treatments. Patient verbalizes understanding of instructions and has had the opportunity to ask questions.     Kaity Sanchez NP  04/03/23   1:48 PM

## 2023-04-03 NOTE — GROUP NOTE
DANIELA  GROUP DOCUMENTATION INDIVIDUAL                                                                          Group Therapy Note    Date: 4/3/2023    Group Start Time:  9:00 AM  Group End Time:  9:45 AM  Group Topic: Process Group - Outpatient    [facility] [place] [address] ACUTE BEHAV HLTH    [facility]    IP [address] [facility] GROUP DOCUMENTATION GROUP    Group Therapy Note    This writer facilitated the process group. The patients were encouraged to set a goal for the day. The patients were encouraged to participate on discussion of work and returning to work with peers who shared that work is a source of stress for them. The patients were encouraged to provide support, feedback, and advice. Attendees: 8       Attendance: Did not attend    Patient's Goal:  met with individual therapist    Interventions/techniques: N/A    Follows Directions: N/A    Interactions: N/A    Mental Status: N/A    Behavior/appearance: N/A    Goals Achieved: N/A      Additional Notes:  Patient met with individual therapist during group.      Bety Jonas

## 2023-04-03 NOTE — GROUP NOTE
DANIELA  GROUP DOCUMENTATION INDIVIDUAL                                                                          Group Therapy Note    Date: 4/3/2023    Group Start Time: 10:50 AM  Group End Time: 11:40 AM  Group Topic: Education Group - Outpatient    [facility] [place] [facility] ACUTE BEHAV HLTH    Weeks, Kristal SUAREZ [facility] GROUP DOCUMENTATION GROUP    Group Therapy Note  This writer facilitated a cognitive skills group focused on normalizing fear of change, discomfort as part of healthy change, anxiety at transitioning to a different level of care, and the FAST DBT communication skill. This writer opened with group introductions for a new group member. Fear of change, discomfort as part of change, and anxiety around transitioning to a different level of care were addressed due to a discharging patient expressing anxiety. The writer supported peer feedback and support. The writer provided validation of the valid. The writer provided education on the FAST skill and feedback around healthy communication expectations. Attendees: 8       Attendance: Attended    Patient's Goal:  learning FAST skills and embracing discomfort for growth    Interventions/techniques: Informed, Validated, and Promoted peer support    Follows Directions: Followed directions    Interactions: Interacted appropriately    Mental Status: Calm and Congruent    Behavior/appearance: Attentive and Cooperative    Goals Achieved: Able to listen to others and Able to give feedback to another      Additional Notes:  Patient presented as alert and engaged. Patient listened respectfully and introduced herself to a new peer. Patient provided feedback to peers. Patient will continue to work on learning FAST skills and embracing discomfort for growth.      Jimmie Coy

## 2023-04-03 NOTE — BH NOTES
Goal: Conversation about insurance coverage      Writer met with pt to explain that her insurance is currently out of network as they work on the application to establish coverage for [employer]. Writer spoke with insurance rep, [facility], [address] [facility]. Pt reported she is unable to function or work without treatment and plans to remain in program while working with insurance to get coverage. Pt made aware that she is billed for treatment at this time, and that [employer] staff will continue to support as needed.

## 2023-04-03 NOTE — GROUP NOTE
DANIELA  GROUP DOCUMENTATION INDIVIDUAL                                                                          Group Therapy Note    Date: 4/3/2023    Group Start Time:  9:00 AM  Group End Time:  9:45 AM  Group Topic: [employer]    [address] ACUTE BEHAV HLTH    Weeks, Kristal    IP 1150 [facility] GROUP DOCUMENTATION GROUP    Group Therapy Note    This writer facilitated the morning check in community group focused on evaluating presentation, assessing for safety, and processing recent events. The writer encouraged patients to share events in their lives, identify triggers for difficult emotions, and identify barriers to progress versus areas of progress. The writer supported peer feedback and support. Attendees: 8       Attendance: Attended    Patient's Goal:  disclosing safety concerns and engaging with peer support    Interventions/techniques: Validated, Promoted peer support, and Provide feedback    Follows Directions: Followed directions    Interactions: Interacted appropriately    Mental Status: Calm and Congruent    Behavior/appearance: Attentive and Cooperative    Goals Achieved: Able to engage in interactions, Able to listen to others, and Able to self-disclose      Additional Notes:  Patient presented as alert and engaged. Patient reported daily SI on the check in form and was pulled from group for crisis support. While present, patient verbalized relating to frustrations with the economy and job market. Patient will continue to work on disclosing safety concerns and engaging with peer support.      Meryl Garza

## 2023-04-03 NOTE — GROUP NOTE
IP  GROUP DOCUMENTATION INDIVIDUAL                                                                          Group Therapy Note    Date: 4/3/2023    Group Start Time: 12:20 PM  Group End Time:  1:10 PM  Group Topic: Education Group - Outpatient    [address] ACUTE BEHAV HLTH    Naima Mora    IP 1150 [facility] GROUP DOCUMENTATION GROUP    Group Therapy Note    The patients were provided information and prompts on gratitude journaling. The patients were encouraged to read through educational materials with this writer and to practice journaling. Attendees: 8       Attendance: Attended    Patient's Goal:  practice mindfulness    Interventions/techniques: Other gratitude journal    Follows Directions: Followed directions    Interactions: Interacted appropriately    Mental Status: Calm and Congruent    Behavior/appearance: Attentive and Cooperative    Goals Achieved: Able to listen to others, Able to give feedback to another, and Able to reflect/comment on own behavior      Additional Notes: The patient who participated was receptive to educational materials on gratitude journaling. The patient participated in mindfulness journaling activity. The patient shared gratitude she has for her [place]. The patient participated in sharing journal entries and discussing experiences while journaling.      Maria M Hunter

## 2023-04-03 NOTE — GROUP NOTE
DANIELA  GROUP DOCUMENTATION INDIVIDUAL                                                                          Group Therapy Note    Date: 4/3/2023    Group Start Time:  1:10 PM  Group End Time:  2:00 PM  Group Topic: Education Group - Outpatient    [facility] 3 ACUTE BEHAV HLTH    Weeks, Kristal    IP 1150 [facility] GROUP DOCUMENTATION GROUP    Group Therapy Note  This writer facilitated a psychoeducation group on psychological flexibility skills from [employer]. The writer provided education on detachment from thoughts, acceptance, and the benefits of not acting in extremes. The writer encouraged patients to share about their experiences with thoughts they have a hard time detaching from and situations they struggle to accept. Attendees: 8       Attendance: Attended    Patient's Goal:  learning psychological flexibility skills    Interventions/techniques: Informed, Validated, and Promoted peer support    Follows Directions: Followed directions    Interactions: Interacted appropriately    Mental Status: Calm and Congruent    Behavior/appearance: Attentive and Cooperative    Goals Achieved: Able to listen to others and Able to self-disclose      Additional Notes:  Patient presented as alert and engaged. Patient listened respectfully and discussed experiences with intrusive thoughts. Patient will continue to work on learning psychological flexibility skills.      Deborrah Sides

## 2023-04-03 NOTE — GROUP NOTE
DANIELA BH GROUP DOCUMENTATION INDIVIDUAL                                                                          Group Therapy Note    Date: 4/3/2023    Group Start Time:  2:00 PM  Group End Time:  2:45 PM  Group Topic: Process Group - Outpatient    [facility] [place] [address] ACUTE BEHAV HLTH    Prudence Alma    IP 1150 [facility] GROUP DOCUMENTATION GROUP    Group Therapy Note    This writer encouraged the patients to complete their wrap up assessments. The patients were encouraged to participate in saying goodbye to a discharging peer by sharing experience with the peer and participating in discussion on discharge. Attendees: 9       Attendance: Attended    Patient's Goal:  identify emotions    Interventions/techniques: Other wrap up assessment    Follows Directions: Followed directions    Interactions: Interacted appropriately    Mental Status: Calm and Congruent    Behavior/appearance: Attentive, Cooperative, and Motivated    Goals Achieved: Able to engage in interactions, Able to listen to others, and Identified feelings      Additional Notes: The patient completed the daily wrap up assessment. The patient reported recent thoughts of SI. The patient shared experience with discharging peers and participated in discussions on preparation for discharge. The patient encouraged the discharging peer. The patient will continue to identify emotions in the wrap up group.     Anell Police

## 2023-04-04 ENCOUNTER — HOSPITAL ENCOUNTER (OUTPATIENT)
Dept: BEHAVIORAL/MENTAL HEALTH | Age: 28
End: 2023-04-04
Payer: COMMERCIAL

## 2023-04-04 PROCEDURE — 90853 GROUP PSYCHOTHERAPY: CPT

## 2023-04-04 NOTE — PROGRESS NOTES
MEDICATION GROUP THERAPY PROGRESS NOTE      Marija Betts was present for medication group. TOPIC: Medication adherence    GROUP TIME: 1:10 - 2:00 PM Tuesday    PERSONAL GOAL FOR PARTICIPATION: To be present for group, participate in discussion, and answer patient-directed questions. GOAL ORIENTATION: Personal    THERAPEUTIC INTERVENTIONS REVIEWED AND DISCUSSED: The following topics were presented: common barriers to taking medications including but not limited to cost, transportation, complexity of regimen, lack of knowledge about medication regimen;  beliefs regarding medications; strategies on how to overcome barriers and beliefs regarding medications in order to improve medication adherence. Patients were given time to ask questions regarding their current therapy. 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 Asked facilitator a question about time to effect of bupropion. Also discussed recently restarting on quetiapine to help with sleep and difficulties with managing side effects from it. Reports she finds hydroxyzine helpful for anxiety.        Mychal Hitchcock, LIBERTYD, BCPS, [facility]  Clinical Pharmacy Specialist, Behavioral Health  Desk: [phone]   Pharmacy: [phone]

## 2023-04-04 NOTE — GROUP NOTE
DANIELA  GROUP DOCUMENTATION INDIVIDUAL                                                                          Group Therapy Note    Date: 4/4/2023    Group Start Time: 12:25 PM  Group End Time:  1:10 PM  Group Topic: Education Group - Outpatient    [facility] 3 ACUTE BEHAV HLTH    Weeks, Kristal    DANIELA 1150 [facility] GROUP DOCUMENTATION GROUP    Group Therapy Note  This writer facilitated a cognitive skills group focused on core beliefs and cognitive distortions. This writer provided education on how cognitive distortions form and function as well as their relationship to cognitive distortions. Cognitive distortions were reviewed and patients were encouraged to discuss their relationships with them. The writer supported peer feedback and support. Attendees: 7       Attendance: Attended    Patient's Goal:  gaining insight into cognitive distortions and core beliefs. Interventions/techniques: Informed, Validated, Promoted peer support, and Provide feedback    Follows Directions: Followed directions    Interactions: Interacted appropriately    Mental Status: Calm and Congruent    Behavior/appearance: Attentive and Cooperative    Goals Achieved: Able to engage in interactions, Able to listen to others, and Able to self-disclose      Additional Notes:  Patient presented as alert and engaged. Patient listened respectfully, self-disclosed around the impact of cognitive distortions, and demonstrated critical thinking around the subject matter. Patient will continue to work on gaining insight into cognitive distortions and core beliefs.      Carlitos Ruffin

## 2023-04-04 NOTE — GROUP NOTE
DANIELA  GROUP DOCUMENTATION INDIVIDUAL                                                                          Group Therapy Note    Date: 4/4/2023    Group Start Time:  9:50 AM  Group End Time: 10:40 AM  Group Topic: Process Group - Outpatient    [facility] [place] 3 ACUTE BEHAV HLTH    Weeks, Kristal    DANIELA 1150 [facility] GROUP DOCUMENTATION GROUP    Group Therapy Note    This writer facilitated process group. Group themes included body image issues and their intersection with relationships as well as how treatment of others reflects self-perception. The writer utilized open-ended and socratic questions to encourage conversation and introspection as well as provided feedback and validation. The writer supported peer feedback and support. In the last 10 minutes of group, the writer closed with supporting introductions for a new patient joining the group. Attendees: 8       Attendance: Attended    Patient's Goal:  engaging with peer support    Interventions/techniques: Validated, Promoted peer support, and Provide feedback    Follows Directions: Followed directions    Interactions: Interacted appropriately    Mental Status: Calm and Congruent    Behavior/appearance: Attentive and Cooperative    Goals Achieved: Able to engage in interactions and Able to give feedback to another      Additional Notes:  Patient presented as alert and engaged. Patient provided healthy feedback to peers and asked relevant questions. Patient will continue to work on engaging with peer support.     Ricci Martinez

## 2023-04-04 NOTE — GROUP NOTE
[facility] GROUP DOCUMENTATION INDIVIDUAL                                                                          Group Therapy Note    Date: 4/4/2023    Group Start Time:  2:00 PM  Group End Time:  2:45 PM  Group Topic: Process Group - Outpatient    [address] [facility] 3 ACUTE BEHAV HLTH    David Gutierrez     [facility]    Group Therapy Note    The patients were encouraged to complete the wrap up assessment during group time. The patients were encouraged to complete an afternoon agenda prioritizing self-care. The patients were encouraged to discuss items of self-care on their agenda. Attendees: 9       Attendance: Attended    Patient's Goal:  identify emotions and plan for self-care    Interventions/techniques: Other wrap up assessment and encourage self-care    Follows Directions: Followed directions    Interactions: Interacted appropriately    Mental Status: Calm and Congruent    Behavior/appearance: Attentive, Cooperative, and Motivated    Goals Achieved: Able to engage in interactions, Able to listen to others, Discussed coping, and Identified feelings      Additional Notes: The patient completed the wrap up assessment and denied SI/HI. The patient completed the self-care agenda and participated in discussion on agenda with peers. The patient struggled to identify what she would do for self-care. The patient will continue to identify emotions and plan for self-care in the wrap up group.     Federica Carrington

## 2023-04-04 NOTE — GROUP NOTE
IP  GROUP DOCUMENTATION INDIVIDUAL                                                                          Group Therapy Note    Date: 4/4/2023    Group Start Time:  9:00 AM  Group End Time:  9:45 AM  Group Topic: [employer]    [facility] [place] 3 ACUTE BEHAV HLTH    Preston March    IP 1150 [facility] GROUP DOCUMENTATION GROUP    Group Therapy Note    This writer facilitated a community group with the patients. The patients were encouraged to complete the daily check in assessments. The patients were encouraged to share about experiencing over the weekend and to provide support, feedback, and advice to peers as appropriate. Attendees: 8       Attendance: Attended    Patient's Goal:  identify emotions    Interventions/techniques: Promoted peer support and Other check in assessment    Follows Directions: Followed directions    Interactions: Interacted appropriately    Mental Status: Calm and Congruent    Behavior/appearance: Attentive, Cooperative, and Motivated    Goals Achieved: Able to engage in interactions, Able to listen to others, and Identified feelings      Additional Notes: The patient completed the morning assessment and denied SI/HI as of that assessment. The patient participated in morning conversation about forming relationships and returning to work with peers. The patient was supportive of peers and shared details of her evening and getting to sleep too late. The patient shared suggestions for healthier sleep habits. The patient will continue to identify mood and self-disclose in community group.       [facility]

## 2023-04-04 NOTE — GROUP NOTE
[facility] GROUP DOCUMENTATION INDIVIDUAL                                                                          Group Therapy Note    Date: 4/4/2023    Group Start Time: 10:50 AM  Group End Time: 11:40 AM  Group Topic: Education Group - Outpatient    [facility] [place] 3 ACUTE BEHAV HLTH    Cora Roca    IP [facility] GROUP DOCUMENTATION GROUP    Group Therapy Note    This writer facilitated a cognitive skills group on strengths identification and self-esteem. The patients were provided some educational materials on strength identification and self-esteem and encouraged to complete a work sheet assisting them identifying personal strengths as well as things that they like about themselves. Attendees: 9       Attendance: Attended    Patient's Goal:  identify strenghts    Interventions/techniques: Informed and Promoted peer support    Follows Directions: Followed directions    Interactions: Interacted appropriately    Mental Status: Calm and Congruent    Behavior/appearance: Attentive, Cooperative, and Motivated    Goals Achieved: Able to engage in interactions, Able to listen to others, and Able to reflect/comment on own behavior      Additional Notes: The patient was open to educational materials on strength identification. The patient participated in strength and self-esteem identification exercise with peers. The patient shared that she is logical and that it is a strength that has kept her alive when her emotions were telling her to end her life. The patient will continue to identify strengths in the cognitive skills group.     Soundra Clipper

## 2023-04-05 ENCOUNTER — HOSPITAL ENCOUNTER (OUTPATIENT)
Dept: BEHAVIORAL/MENTAL HEALTH | Age: 28
End: 2023-04-05
Payer: COMMERCIAL

## 2023-04-05 PROCEDURE — 90853 GROUP PSYCHOTHERAPY: CPT

## 2023-04-05 NOTE — GROUP NOTE
DANIELA  GROUP DOCUMENTATION INDIVIDUAL                                                                          Group Therapy Note    Date: 4/5/2023    Group Start Time:  9:00 AM  Group End Time:  9:45 AM  Group Topic: [employer]    [facility] [place] 3 ACUTE BEHAV HLTH    Weeks, Kristal    DANIELA 1150 [facility] GROUP DOCUMENTATION GROUP    Group Therapy Note    This writer facilitated the morning check in community group focused on evaluating presentation, assessing for safety, and processing recent events. The writer encouraged patients to share events in their lives, identify triggers for difficult emotions, and identify ways in which the patients perceive themselves to be making progress. The writer used questions and direct feedback to directly challenge unhealthy misperceptions or cognitive distortions exhibited by patients. The writer prompted and supported peer feedback. Attendees: 8       Attendance: Attended    Patient's Goal:  disclosing safety concerns and engaging with peer support    Interventions/techniques: Challenged, Validated, Promoted peer support, and Provide feedback    Follows Directions: Followed directions    Interactions: Interacted appropriately    Mental Status: Calm and Congruent    Behavior/appearance: Attentive and Cooperative    Goals Achieved: Able to give feedback to another and Able to self-disclose      Additional Notes:  Patient presented as alert and engaged. Patient denied SI/HI/CLARISSA on the check in form. Patient provided healthy feedback to peers as well as used healthy self-disclosure to relate to peers. Patient discussed feeling harassed on her way in to the program today. Patient will continue to work on disclosing safety concerns and engaging with peer support.     Percy Harris

## 2023-04-05 NOTE — GROUP NOTE
DANIELA  GROUP DOCUMENTATION INDIVIDUAL                                                                          Group Therapy Note    Date: 4/5/2023    Group Start Time: 12:20 PM  Group End Time:  1:10 PM  Group Topic: Education Group - Outpatient    137 [facility] 3 ACUTE BEHAV HLTH    Weeks, Kristal    DANIELA 1150 [facility] GROUP DOCUMENTATION GROUP    Group Therapy Note  This writer facilitated a cognitive skills group focused on cognitive distortions and thought challenging. This writer provided education on when to use thought challenging versus when to first use emotion regulation skills to allow for entering [employer] to better practice thought challenging. This writer provided brief education on emotion regulation skills and rejection as a potential trigger for fight/flight/freeze/gayatri. This writer utilized a directive approach to challenge a patient exhibiting a cognitive distortion and provided homework to support building insight into core beliefs. Cognitive distortions were reviewed and patients were encouraged to discuss their relationships with them. The writer supported peer feedback and support. The writer reviewed thought records for thought challenging. Attendees: 6       Attendance: Attended    Patient's Goal:  gaining insight into cognitive distortions and increase use of thought challenging    Interventions/techniques: Challenged, Informed, Validated, and Promoted peer support    Follows Directions: Followed directions    Interactions: Interacted appropriately    Mental Status: Calm and Congruent    Behavior/appearance: Attentive and Cooperative    Goals Achieved: Able to give feedback to another and Able to self-disclose      Additional Notes:  Patient presented as alert and engaged. Patient provided constructive feedback to a peer and discussed which cognitive distortions she experiences.  Patient will continue to work on gaining insight into cognitive distortions and increase use of thought challenging    Kassie [place] Weeks

## 2023-04-05 NOTE — GROUP NOTE
[facility] GROUP DOCUMENTATION INDIVIDUAL                                                                          Group Therapy Note    Date: 4/5/2023    Group Start Time:  1:10 PM  Group End Time:  2:05 PM  Group Topic: Education Group - Outpatient    [address] [facility] [address] 3 ACUTE BEHAV HLTH    America Christian    IP 1150 [facility] GROUP DOCUMENTATION GROUP    Group Therapy Note    Patients were given educational materials relating to self-care. The handout listed different examples of self-care tasks under headings of physical, psychological, emotional, spiritual, personal, and professional self-care. Patients were asked to identify types of self-care that they do well and that they have challenges with. Patients were asked to discuss barriers to engaging in self-care. Patients were encouraged to offer feedback and support to [facility] group members. Attendees: 7       Attendance: Attended    Patient's Goal:  Identify forms of self-care, reflect on barriers to self-care     Interventions/techniques: Informed, Validated, and Promoted peer support    Follows Directions: Followed directions    Interactions: Interacted appropriately    Mental Status: Calm and Congruent    Behavior/appearance: Attentive and Cooperative    Goals Achieved: Able to engage in interactions, Able to listen to others, Able to give feedback to another, Able to reflect/comment on own behavior, Able to receive feedback, Able to self-disclose, and Discussed coping      Additional Notes: The patient engaged actively in self-care group. The patient identified feeling a deficit in all categories of self-care. The patient identified playing with her son, cuddling with her cat, and coming to [facility] as forms of self-care she engages in. The patient identified establishing strong boundaries at work as a form of professional self-care. The patient discussed her use of mental and physical vision boards in the past. The patient was receptive to feedback and support from peers.  The patient offered feedback and support to peers, and challenged a peers assumptions. The patient identified thinking errors for a peer. The patient will continue to practice identifying and engaging in self-care.     Alex Barroso, BSW, MSW

## 2023-04-05 NOTE — GROUP NOTE
IP  GROUP DOCUMENTATION INDIVIDUAL                                                                          Group Therapy Note    Date: 4/5/2023    Group Start Time:  2:00 PM  Group End Time:  2:45 PM  Group Topic: Process Group - Outpatient    [facility] [place] 3 ACUTE BEHAV HLTH    Kathy Philip    IP 1150 [facility] GROUP DOCUMENTATION GROUP    Group Therapy Note    This writer facilitated the wrap up group. The patients were encouraged to complete the afternoon check in assessment. The patients were encouraged to share media they are listening to or have read and have enjoyed. The patients were encouraged to complete an afternoon agenda focusing on self-care. Attendees: 6       Attendance: Attended    Patient's Goal:  identify emotions and plan for self-care    Interventions/techniques: Other wrap up assessment and encourage self-care    Follows Directions: Followed directions    Interactions: Interacted appropriately    Mental Status: Calm and Congruent    Behavior/appearance: Attentive, Cooperative, and Motivated    Goals Achieved: Able to engage in interactions, Able to listen to others, Discussed coping, and Identified feelings      Additional Notes: The patient completed the afternoon assessment and denied SI/HI. The patient participated in conversation on media and self-help. The patient completed the afternoon agenda. The patient identified cleaning her space and doing a foot mask as self-care. The patient will continue to identify emotions and plan for self-care in a group.     Roshni Miranda
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
## 2023-04-05 NOTE — GROUP NOTE
IP  GROUP DOCUMENTATION INDIVIDUAL                                                                          Group Therapy Note    Date: 4/5/2023    Group Start Time: 10:55 AM  Group End Time: 11:40 AM  Group Topic: Education Group - Outpatient    [facility] 3 ACUTE BEHAV HLTH    [place]    IP 1150 [facility] GROUP DOCUMENTATION GROUP    Group Therapy Note    Patients were given a worksheet to aid in identifying and reflecting on personal strengths recognized by the self and by others. Patients were asked to discuss strengths, skills, and qualities they have and that they identify in peers. Patients were encouraged to reflect on and share how they have demonstrated strengths and skills. Patients were encouraged to identify goals and strengths they would like to contribute to during treatment. Patients were encouraged to offer feedback and support to other group members. Attendees: 7       Attendance: Attended    Patient's Goal:  Identify strengths and skills in self and others, reflect on how strengths are demonstrated     Interventions/techniques: Challenged, Validated, and Promoted peer support    Follows Directions: Followed directions    Interactions: Interacted appropriately    Mental Status: Calm and Congruent    Behavior/appearance: Attentive, Cooperative, and Enthusiastic    Goals Achieved: Able to engage in interactions, Able to listen to others, Able to reflect/comment on own behavior, and Able to receive feedback      Additional Notes: The patient engaged actively in cognitive skills group. Identified spatial reasoning and a skill she has. The patient discussed how she communicates professionally, and ways she has advocated for herself in the workplace. The patient was receptive to feedback from peers. The patient discussed the importance of knowing rules and laws to protect yourself in the workplace. The patient appeared to identify strengths on the worksheet.  The patient will continue to practice identifying strengths and engaging in groups.     Kike Olguin, BSW, MSW Student

## 2023-04-13 ENCOUNTER — HOSPITAL ENCOUNTER (OUTPATIENT)
Dept: BEHAVIORAL/MENTAL HEALTH | Age: 28
Discharge: HOME | End: 2023-04-13
Payer: COMMERCIAL

## 2023-04-13 VITALS
OXYGEN SATURATION: 98 % | RESPIRATION RATE: 17 BRPM | HEART RATE: 107 BPM | DIASTOLIC BLOOD PRESSURE: 90 MMHG | TEMPERATURE: 98.1 F | SYSTOLIC BLOOD PRESSURE: 136 MMHG

## 2023-04-13 PROCEDURE — 90853 GROUP PSYCHOTHERAPY: CPT

## 2023-04-17 ENCOUNTER — HOSPITAL ENCOUNTER (OUTPATIENT)
Dept: BEHAVIORAL/MENTAL HEALTH | Age: 28
Discharge: HOME | End: 2023-04-17
Payer: COMMERCIAL

## 2023-04-17 VITALS
OXYGEN SATURATION: 98 % | SYSTOLIC BLOOD PRESSURE: 128 MMHG | HEART RATE: 104 BPM | TEMPERATURE: 97.9 F | DIASTOLIC BLOOD PRESSURE: 93 MMHG

## 2023-04-17 PROCEDURE — 90832 PSYTX W PT 30 MINUTES: CPT

## 2023-04-17 PROCEDURE — 90853 GROUP PSYCHOTHERAPY: CPT

## 2023-04-17 NOTE — GROUP NOTE
DANIELA  GROUP DOCUMENTATION INDIVIDUAL                                                                          Group Therapy Note    Date: 4/17/2023    Group Start Time: 12:20 PM  Group End Time:  1:10 PM  Group Topic: Education Group - Outpatient    [facility] [place] 3 ACUTE BEHAV HLTH    Weeks, Kristal    DANIELA 1150 [facility] GROUP DOCUMENTATION GROUP    Group Therapy Note    This writer facilitated a psychoeducation group focusing on opposite action. The writer provided supplementary education on emotions as not carrying moral weight or being healthy/unhealthy but rather being a signal of needs, wants, core beliefs, and boundaries. The writer discussed emotions as something we cannot control occurring but can use behaviors/thoughts to manage, process, and redirect from as needed. The writer prompted and supported peer feedback. Attendees: 8       Attendance: Attended    Patient's Goal:  using opposite action and increasing knowledge of emotions. Interventions/techniques: Informed, Validated, Promoted peer support, and Provide feedback    Follows Directions: Followed directions    Interactions: Interacted appropriately    Mental Status: Calm and Congruent    Behavior/appearance: Attentive and Cooperative    Goals Achieved: Able to listen to others and Able to give feedback to another      Additional Notes:  Patient presented as alert and engaged. Patient presented with attentive body language and listened respectfully to peers. Patient provided feedback to peers. Patient will continue to work on using opposite action and increasing knowledge of emotions.      [facility]

## 2023-04-17 NOTE — PROGRESS NOTES
[facility]  Psychotherapy Note      Diagnosis: F39 Unspecified mood disorder     General Information     GOAL: individual session and treatment plan review    * If patient is absent, state reason for absence, staff intervention, and staff signature. Psychotherapy Session    Start time: 11:05  Stop time: 11:35    Problem number: 2 and 3  Short term goal (STP): 2.3, 3.1    Patient Mental Status and Mood/Affect:Anxious and Preoccupied    Patient Behavior and Appearance: Poor Eye Contact , Attentive, and Cooperative    Intervention/Techniques: Validated/Supported, Listened/Empathized, Queired/Probed, and Provided Feedback    Focus of Session/Patient Response and Progress Towards Goal: Staff met with the patient for individual session and treatment plan review. Session focused on goal 2.3, stress/anxiety management, and 3.1, skills for managing trauma symptoms. Patient presented as initially reluctant to discuss recent events despite presenting with anxious affect. Patient agreed to discuss upon further gentle encouragement. Patient expressed anxiety and frustration around a change to her court date and its possible impact on her ability to apply for new jobs. Patient discussed frustrations with the judicial system. Patient reported continued anhedonia, low mood, and difficulty with motivation and reported intention to advocate for a medication change. Patient reported a positive change in trauma symptoms and, upon being asked if these changes occurred following disclosure, reported they had. Patient discussed motivation to continue healing from trauma due to frustrations with symptoms. Patient presented as receptive to treatment plan review. Patient endorsed progress in all areas except processing trauma since last review. Patient presents as appropriate for treatment in PHP at this time.      Clinician Signature: ANGY Forbes

## 2023-04-17 NOTE — GROUP NOTE
DANIELA  GROUP DOCUMENTATION INDIVIDUAL                                                                          Group Therapy Note    Date: 4/17/2023    Group Start Time:  2:00 PM  Group End Time:  2:45 PM  Group Topic: [employer]    [facility] [place] 3 ACUTE BEHAV HLTH    Weeks, Kristal    IP 1150 [facility] GROUP DOCUMENTATION GROUP    Group Therapy Note    This writer facilitated a community wrap up group focused on assessing for safety, coping skills practice, and planning ways to engage in healthy coping while in the community. The writer facilitated a symptom and safety check in using the afternoon check in form. The writer facilitated a self-care assessment activity to support patients in identifying areas of strength and areas for growth in their self-care. The writer encouraged patients to discuss their responses to the activity and provided feedback as relevant. The writer prompted and supported peer feedback. Attendees: 9       Attendance: Attended    Patient's Goal:  disclosing safety concerns and improving self-care. Interventions/techniques: Validated, Promoted peer support, and Provide feedback    Follows Directions: Followed directions    Interactions: Interacted appropriately    Mental Status: Calm and Congruent    Behavior/appearance: Attentive and Cooperative    Goals Achieved: Able to listen to others and Able to give feedback to another      Additional Notes:  Patient presented as alert and engaged. Patient denied SI/HI/CLARISSA on the check out form. Patient provided feedback to peers. Patient will continue to work on disclosing safety concerns and improving self-care.      Katerin Sales

## 2023-04-17 NOTE — GROUP NOTE
IP BH GROUP DOCUMENTATION INDIVIDUAL                                                                          Group Therapy Note    Date: 4/17/2023    Group Start Time:  9:00 AM  Group End Time:  9:45 AM  Group Topic: [employer]    [facility] [place] 3 ACUTE BEHAV HLTH    Philip Reed    IP 1150 [facility] GROUP DOCUMENTATION GROUP    Group Therapy Note    This writer facilitated community groups with the patients. The patients were encouraged to complete the check in assessments. The patients were encouraged to discuss their weekend with peers and share any noteworthy experiences. The patients were encouraged to listen and provide feedback to peers. Attendees: 9       Attendance: Attended    Patient's Goal:  identify mood and self-disclose    Interventions/techniques: Promoted peer support and Other check in assessment    Follows Directions: Followed directions    Interactions: Interacted appropriately    Mental Status: Calm and Congruent    Behavior/appearance: Attentive, Cooperative, and Motivated    Goals Achieved: Able to engage in interactions, Able to listen to others, and Identified feelings      Additional Notes: The patient completed the daily check in assessment and denied SI/HI. The patient participated in group conversation about weekend events. The patient alluded to having a difficult weekend but noted that she did not want to share at this time. The patient provided feedback and support to peers who shared difficult experiences over the weekend. The patient will continue to self-disclose and accept peer feedback in community group.     Claudio Wayne

## 2023-04-17 NOTE — GROUP NOTE
DANIELA  GROUP DOCUMENTATION INDIVIDUAL                                                                          Group Therapy Note    Date: 4/17/2023    Group Start Time:  1:10 PM  Group End Time:  2:00 PM  Group Topic: CBT    [facility] [place] 3 ACUTE BEHAV HLTH    Srinivasa Boucher    IP 1150 [facility] GROUP DOCUMENTATION GROUP    Group Therapy Note    The patients were encouraged to follow along as a peer used a recent event to allow this writer to demonstrate a thought record. Attendees: 9       Attendance: Attended    Patient's Goal:  participate in thought record    Interventions/techniques: Informed    Follows Directions: Followed directions    Interactions: Interacted appropriately    Mental Status: Calm and Congruent    Behavior/appearance: Attentive, Cooperative, Motivated, and Neatly groomed    Goals Achieved: Able to engage in interactions and Able to listen to others      Additional Notes: The patient discussed media and its use as a coping strategy and potential trigger. The patient gave peers recommendations of media that has helped with coping or improving mood. The patient provided peer feedback with her experience as a teenage daughter. The patient encouraged peers during discussion. The patient will continue to be open to self-disclose in psych ed group.      Reshma Guillory

## 2023-04-17 NOTE — GROUP NOTE
DANIELA  GROUP DOCUMENTATION INDIVIDUAL                                                                          Group Therapy Note    Date: 4/17/2023    Group Start Time:  9:50 AM  Group End Time: 10:40 AM  Group Topic: Process Group - Outpatient    [facility] [place] 3 ACUTE BEHAV HLTH    Weeks, Kristal    DANIELA 1150 [facility] GROUP DOCUMENTATION GROUP    Group Therapy Note    This writer facilitated process group. Themes of finding peace, calm, connection, and catharsis through media/art/literature were discussed. This writer encouraged patients to identify and share media they have a personal connection to and discuss that connection with peers. The writer prompted and supported peer feedback. The writer provided education on art/media/literature as safe ways to process difficult emotions/subjects. Attendees: 8       Attendance: Attended    Patient's Goal:  engaging with peer support and identifying healthy sources of coping and processing. Interventions/techniques: Validated, Promoted peer support, and Provide feedback    Follows Directions: Followed directions    Interactions: Interacted appropriately    Mental Status: Anxious    Behavior/appearance: Attentive, Cooperative, and Withdrawn/quiet    Goals Achieved: Able to engage in interactions, Able to listen to others, and Able to give feedback to another      Additional Notes:  Patient presented as alert and engaged with anxious affect as evidenced by declining to share about stressors and bouncing leg. Patient identified media she identifies with and discussed with peers. Patient provided feedback to peers. Patient will continue to work on engaging with peer support and identifying healthy sources of coping and processing.      Arnel Mckeon

## 2023-04-17 NOTE — GROUP NOTE
DANIELA  GROUP DOCUMENTATION INDIVIDUAL                                                                          Group Therapy Note    Date: 4/17/2023    Group Start Time: 10:50 AM  Group End Time: 11:40 AM  Group Topic: Education Group - Outpatient    [facility] [place] 3 ACUTE BEHAV HLTH    Monique Blind     [facility] GROUP    Group Therapy Note    The patients were given the opportunity to continue discussion of media from previous group. The patient continued their discussion on media and mental health throughout the group. Attendees: 8       Attendance: Did not attend    Patient's Goal:  met with individual therapist    Interventions/techniques: N/A    Follows Directions: N/A    Interactions: N/A    Mental Status: N/A    Behavior/appearance: N/A    Goals Achieved: N/A      Additional Notes:  Patient met with individual therapist during group time.      Armen Cogan

## 2023-04-18 ENCOUNTER — HOSPITAL ENCOUNTER (OUTPATIENT)
Dept: BEHAVIORAL/MENTAL HEALTH | Age: 28
Discharge: HOME | End: 2023-04-18
Payer: COMMERCIAL

## 2023-04-18 VITALS
HEART RATE: 91 BPM | DIASTOLIC BLOOD PRESSURE: 78 MMHG | OXYGEN SATURATION: 98 % | TEMPERATURE: 98 F | SYSTOLIC BLOOD PRESSURE: 124 MMHG

## 2023-04-18 PROCEDURE — 90832 PSYTX W PT 30 MINUTES: CPT

## 2023-04-18 PROCEDURE — 90853 GROUP PSYCHOTHERAPY: CPT

## 2023-04-18 NOTE — GROUP NOTE
DANIELA  GROUP DOCUMENTATION INDIVIDUAL                                                                          Group Therapy Note    Date: 4/18/2023    Group Start Time:  2:00 PM  Group End Time:  2:45 PM  Group Topic: Community Meeting    [facility] - Lidia Thakkar    IP 1150 [facility] GROUP DOCUMENTATION GROUP    Group Therapy Note    Attendees: 9/10    Wrap Up group: pts were asked how they felt the day went as a check in question. Pts filled out afternoon wrap up group worksheet and discussed ways in which they were going to take care of themselves in the evening. Pts encouraged to reflect on skills they had learned for the day and provide feedback to one another. Attendance: Did not attend  Additional Notes:  pt had to leave group after completing wrap up sheet. Pt denied SI/HI/AVH.     DELFINA

## 2023-04-18 NOTE — GROUP NOTE
DANIELA  GROUP DOCUMENTATION INDIVIDUAL                                                                          Group Therapy Note    Date: 4/18/2023    Group Start Time: 10:50 AM  Group End Time: 11:40 AM  Group Topic: CBT    [facility] - Lidia Thakkar    IP 1150 [facility] GROUP DOCUMENTATION GROUP    Group Therapy Note    Attendees: 10/10    CBT: pts were provided worksheet regarding forgiveness. Pts then discussed what forgiveness looks like for them and how to work on forgiving themselves also. Pts shared situations in which they feel a need to gorgive someone or forgive others. Pts were encouraged to provide feedback to one another and reflect on group        Attendance: Attended    Patient's Goal:  attend activities and groups     Interventions/techniques: Validated, Promoted peer support, Provide feedback, and Supported    Follows Directions: Followed directions    Interactions: Interacted appropriately    Mental Status: Calm, Congruent, and Flat    Behavior/appearance: Neatly groomed and Withdrawn/quiet    Goals Achieved: Able to listen to others      Additional Notes:  pt was quiet throughout group. Pt did not appear to want to engage but was listening to peers. Pt is making progress towards goals by attending and participating in groups.     [employer]

## 2023-04-18 NOTE — PROGRESS NOTES
MEDICATION GROUP THERAPY PROGRESS NOTE      Yolanda Cazares was present for medication group. TOPIC: Medication safety    GROUP TIME: 1:10 - 2:00 PM Tuesday    PERSONAL GOAL FOR PARTICIPATION: To be present for group, participate in discussion, and answer patient-directed questions. GOAL ORIENTATION: Personal    THERAPEUTIC INTERVENTIONS REVIEWED AND DISCUSSED: The following topics were presented: questions to ask your doctor when a new medication is prescribed, who to talk to if you have questions about medications, drug-drug interactions, effect of substances of abuse and alcohol on mental health, the importance of carrying an updated medication list and how to create your own medication list.    IMPRESSION OF PARTICIPATION: Osvaldo shared her favorite food is mac & cheese. She was quiet throughout group, often looking down at her hands or out the window.        Jaquan Montague, LIBERTYD, BCPS, [facility]  Clinical Pharmacy Specialist, [facility]  Desk: [phone]  Pharmacy: [phone]

## 2023-04-18 NOTE — PROGRESS NOTES
Partial Hospitalization ProgramBehavioral Health  Psychotherapy Note      Diagnosis: F39 Unspecified mood disorder    General Information    GOAL: Crisis support    * If patient is absent, state reason for absence, staff intervention, and staff signature. Psychotherapy Session    Start time: 36  Stop time: 6054    Problem number: 1  Short term goal (STP): 1.1 and 1.2    Patient Mental Status and Mood/Affect:Depressed, Labile, Anxious, and Preoccupied    Patient Behavior and Appearance: Poor Eye Contact , Agitated, Attentive, Cooperative, and Negative    Intervention/Techniques: Validated/Supported, Reframed, Challenged, Reinforced peer support, and Listened/Empathized    Focus of Session/Patient Response and Progress Towards Goal: Staff met with the patient to provide crisis support following the patient writing \"grecia [sic], but not really. Just tired\" underneath the question about SI/CLARISSA on the check in sheet. Patient initially reported confusion on how to elaborate on what she wrote. Patient presented as receptive to support from staff on expressing her thoughts, stating she has been experiencing passive SI without plan or intent. Patient presented as anxious, agitated, and at times tearful. Patient discussed feelings of fatigue related to \"always having bad luck\" and \"always having to handle my own problems. \" Patient discussed feeling she has been taking care of herself and having to be resilient against adversity/trauma \"since birth. \" Patient endorsed disappointment, hopelessness, and overwhelm related to her postponed court date and worries about how that will impact her employment. Patient discussed financial stressors. Patient discussed worries that all her work to establish a career will be \"destroyed. \" Patient presented as receptive though tearful to feedback relating to radical acceptance, taking difficulties day by day, and finding ways to redirect her thoughts to avoid spiraling.  Patient presented as receptive to encouragement and redirection to share her emotions and stressors with her peers for emotional support. Patient reviewed her safety plan and was provided a copy. Patient agreed to step into a dark room and use fidgets and deep breathing to continue regulation. Patient reported feeling safe to practice regulation without staff present. Staff checked on the patient 5 minutes later, and she presented as more regulated and closer to her baseline. Patient soon joined group again without prompting. Patient continued to present as appropriate for treatment in [facility].     Clinician Signature: ANGY Fernando

## 2023-04-18 NOTE — GROUP NOTE
DANIELA  GROUP DOCUMENTATION INDIVIDUAL                                                                          Group Therapy Note    Date: 4/18/2023    Group Start Time:  9:50 AM  Group End Time: 10:40 AM  Group Topic: Process Group - Inpatient    [facility] [place] [facility] Lidia Rivera    IP 1150 [facility] GROUP DOCUMENTATION GROUP    Group Therapy Note    Attendees: 9/9    Process: Pts shared how they were feeling as a check in question. Pts then discussed healthy versus unhealthy relationships. Pts encouraged to open up about personal experiences and provide positive feedback to one another. Writer provided psych-education on the dynamics of unhealthy relationships and how it impacts someone. Pts were encouraged to reflect on group       Attendance: Attended    Patient's Goal:  attend activities and groups     Interventions/techniques: Validated, Promoted peer support, Provide feedback, and Supported    Follows Directions: Followed directions    Interactions: Interacted appropriately    Mental Status: Calm, Congruent, and Flat    Behavior/appearance: Attentive    Goals Achieved: Able to listen to others      Additional Notes:  pt was pulled from group to meet individually with therapist. Pt came back for last bit of group and listened to peers. Pt is making progress towards goals by attending and participating in groups.     [employer]

## 2023-04-18 NOTE — GROUP NOTE
DANIELA  GROUP DOCUMENTATION INDIVIDUAL                                                                          Group Therapy Note    Date: 4/18/2023    Group Start Time: 12:20 PM  Group End Time:  1:10 PM  Group Topic: Education Group - Outpatient    [facility] [place] 3 ACUTE BEHAV HLTH    Weeks, Kristal    IP 1150 [facility] GROUP DOCUMENTATION GROUP    Group Therapy Note  This writer facilitated psychoeducation group on the distress tolerance skills TIPP and ACCEPTS. This writer provided education on benefits of selecting the appropriate skill for the level of dysregulation. This writer normalized healthy modification of coping skills to fit a persons needs as well as that not every coping skill will be effective for every person. This writer encouraged patients to share about their experiences with coping skills use. Attendees: 9       Attendance: Attended    Patient's Goal:  applying distress tolerance skills to daily living. Interventions/techniques: Informed, Validated, and Provide feedback    Follows Directions: Followed directions    Interactions: Interacted appropriately    Mental Status: Anxious    Behavior/appearance: Attentive, Cooperative, and Withdrawn/quiet    Goals Achieved: Able to listen to others      Additional Notes:  Patient presented as attentive though uncomfortable as evidenced by looking at the educational handouts while snapping a rubber band on her wrist, fidgeting, and lack of participation. This is not in congruence with the patients observed baseline. Patient will continue to work on applying distress tolerance skills to daily living.      Uziel Pa

## 2023-04-18 NOTE — GROUP NOTE
DANIELA  GROUP DOCUMENTATION INDIVIDUAL                                                                          Group Therapy Note    Date: 4/18/2023    Group Start Time:  9:00 AM  Group End Time:  9:40 AM  Group Topic: Community Meeting    [address] [facility] OP PHP    Maryana Lidia    IP 1150 [facility] GROUP DOCUMENTATION GROUP    Group Therapy Note    Attendees: 9/9    Community Check in: Pts were asked how they are doing as a check in question. Pts then filled out behavioural health check in sheet and discussed their night prior. Pts shared ways that they took care of themselves, what they discussed the previous day and their goals for today. Pts were encouraged to provide feed back to one another and reflect on group       Attendance: Attended    Patient's Goal:  attend activities and groups     Interventions/techniques: Validated, Promoted peer support, Provide feedback, and Supported    Follows Directions: Followed directions    Interactions: Interacted appropriately    Mental Status: Calm, Congruent, Depressed, and Flat    Behavior/appearance: Neatly groomed, Poor eye contact, and Withdrawn/quiet    Goals Achieved: Able to listen to others      Additional Notes:  pt was quiet throughout group. Pt appeared to seem frustrated during group and guarded. Pt is making progress towards goals by attending and participating in groups.     [facility]

## 2023-04-18 NOTE — BH NOTES
OUTPATIENT PHYSICIAN PROGRESS NOTE      Chief Complaint/Symptoms/Impairments (as noted in Treatment Plan): C-PTSD by history. Unspecified mood disorder. Autistic Spectrum Disorder, Attention Deficit Disorder    Criteria for Continued Treatment (check all that apply):   [x] Preventing Decompensation   [x] Improving Level of Functioning  [] Reducing Isolative Behaviors  [] Understanding Diagnosis and Need for Medications  [] Improving Treatment/Medication Compliance  [] Confronting Denial of Illness  [x] Stabilizing Level of Functioning  [] Improving Emotional/Social/Cognitive Functioning  [] Decreasing Frequency of Hospitalizations    Suicidal/Homicidal ideations:   [x] Absent  [] Present  [] Passive  [] Intent  [] Plan  [] Death Wishes      CURRENT CONDITION OF PATIENT & PROGRESS ON TREATMENT PLAN    Subjective (ongoing complaints by patient regarding symptom severity, presentation, affect, function, etc.):     Rosaline Wang reports the last 2 days have been rough,   \"I feel low. \" Her court date was moved to August 2nd and she is upset about this. Reports mood is \"more depressed. \" Says she is always anxious, takes atarax. Says she was not fill able to fill vyvanse due to $500 copay. She is requesting to go back on adderall. Requesting to go up on wellbutrin. She denies si hi or avh. Behavior (side effects, changes in mental status, objective observations seen in individual sessions or by staff):     Calm and pleasant. Appropriately interactive. Denies si hi or avh. No agitation or aggression. Anxious, rocking back and forth. Assessment/Plan (functional improvement and/or ongoing impairment, response to treatment, plan for future ongoing treatment and medication management to include revisions):     Dc vyvanse. Start adderall xr 10 mg daily. Increase wellbutrin xl to 300 mg daily. Continue rest of meds. Group and milieu therapy. Continue php.    [] NO NEW Medications at this time.    [x] New Medication prescribed and prescription provided to patient  [x] PHP staff notified of changes as needed    [x] Regarding any medications: patient instructed on purpose, benefits, side effects,   risks, and alternative treatments. Patient verbalizes understanding of instructions and has had the opportunity to ask questions.     Nory Melissa NP  04/18/23   2:08 PM

## 2023-04-19 ENCOUNTER — HOSPITAL ENCOUNTER (OUTPATIENT)
Dept: BEHAVIORAL/MENTAL HEALTH | Age: 28
Discharge: HOME | End: 2023-04-19
Payer: COMMERCIAL

## 2023-04-19 VITALS
OXYGEN SATURATION: 99 % | HEART RATE: 101 BPM | DIASTOLIC BLOOD PRESSURE: 73 MMHG | SYSTOLIC BLOOD PRESSURE: 114 MMHG | TEMPERATURE: 98.2 F

## 2023-04-19 PROCEDURE — 90853 GROUP PSYCHOTHERAPY: CPT

## 2023-04-19 NOTE — GROUP NOTE
DANIELA  GROUP DOCUMENTATION INDIVIDUAL                                                                          Group Therapy Note    Date: 4/19/2023    Group Start Time:  1:10 PM  Group End Time:  2:00 PM  Group Topic: Education Group - Outpatient    [facility] 3 ACUTE BEHAV HLTH    Weeks, Kristal    DANIELA 1150 [facility] GROUP DOCUMENTATION GROUP    Group Therapy Note    This writer facilitated psychoeducation group on the distress tolerance skill ACCEPTS. This writer opened with an introduction of a new patient to peers and encouraged patients to share their names, how long they have attended the program, and something to which they are looking forward. This writer prompted patients to review their knowledge of the previously discussed distress tolerance skills to reinforce skills retention. This writer encouraged patients to share about their experiences with coping skills use. Attendees: 10       Attendance: Attended    Patient's Goal:  applying distress tolerance skills to daily living. Interventions/techniques: Informed, Promoted peer support, and Provide feedback    Follows Directions: Followed directions    Interactions: Interacted appropriately    Mental Status: Calm and Congruent    Behavior/appearance: Attentive and Cooperative    Goals Achieved: Able to engage in interactions and Able to listen to others      Additional Notes:  Patient presented as alert and engaged. Patient participated in peer introductions and read from the materials for peers. Patient will continue to work on applying distress tolerance skills to daily living.      Yelena Aldana

## 2023-04-19 NOTE — GROUP NOTE
IP BH GROUP DOCUMENTATION INDIVIDUAL                                                                          Group Therapy Note    Date: 4/19/2023    Group Start Time:  9:00 AM  Group End Time:  9:45 AM  Group Topic: [employer]    [facility] [place] 3 ACUTE BEHAV HLTH    Wash Dilchuck    IP 1150 [facility] GROUP DOCUMENTATION GROUP    Group Therapy Note  Patients were asked to complete their daily check-in assessments. Patients were encouraged to reflect on their use of coping skills yesterday evening. Patients were encouraged to share their thoughts or feelings surrounding treatment today. Patients were encouraged to provide supportive feedback to peers. Attendees: 9       Attendance: Attended    Patient's Goal:  To self-disclose and identify coping skills      Interventions/techniques: Validated and Promoted peer support    Follows Directions: Followed directions    Interactions: Interacted appropriately    Mental Status: Congruent    Behavior/appearance: Attentive and Cooperative    Goals Achieved: Able to manage/cope with feelings and Able to self-disclose      Additional Notes: The patient arrived around 30 minutes late. The patient was engaged in the community group when they arrived. The patient shared recent frustrations surrounding their pending charges and discussed the impact of these charges on their employment. The patient reflected on thoughts and feelings associated with the situation that resulted in the charges. The patient reported that the court date was postponed from today to August. The patient was encouraged to reflect on their use of coping skills during the process group. The patient was receptive to feedback and will continue to self-disclose and provide feedback to peers.      Joshua Das, BSW, MSW Student

## 2023-04-19 NOTE — GROUP NOTE
[facility] GROUP DOCUMENTATION INDIVIDUAL                                                                          Group Therapy Note    Date: 4/19/2023    Group Start Time: 12:30 PM  Group End Time:  1:10 PM  Group Topic: Process Group - Outpatient    [address] [facility] 3 ACUTE BEHAV [facility]    America Christian     [facility]    Group Therapy Note    Patients were given educational materials about the stages of grief, and non-linear grieving patterns. Patients were asked to reflect on and discuss their experiences of the stages of grief and other emotions. Patients were encouraged to reflect and self-disclose. Patients were encouraged to offer feedback and support to each other. Attendees: 10       Attendance: Attended    Patient's Goal:  Discuss stages of grief, identify and process emotions of grief     Interventions/techniques: Informed, Validated, and Promoted peer support    Follows Directions: Followed directions    Interactions: Interacted appropriately    Mental Status: Calm and Congruent    Behavior/appearance: Attentive and Cooperative    Goals Achieved: Able to engage in interactions, Able to listen to others, Able to give feedback to another, Able to self-disclose, and Identified feelings      Additional Notes: The patient engaged actively in grief group. The patient laurie a picture on the board of two squares, one with a large Quechan inside taking up most of the space and one with a very small Quechan. The patient described this as being an explanation from a former clinician. The patient discussed grief as being like the Quechan, and when it hits the walls of the square, it feels painful. As time passes, the Quechan can become smaller or the square can become bigger, but if the Quechan hits the wall, it will still hurt. The patient described how the pain of grief can endure over time, and how processing, coping skills, and diagnoses can impact both the grief Quechan and the emotional square.  The patient offered feedback and support to peers. The patient challenged negative self-talk from a peer. The patient will continue to practice identifying emotions associated with grief.     Gato Conn, BSW, MSW Student
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
## 2023-04-19 NOTE — GROUP NOTE
IP  GROUP DOCUMENTATION INDIVIDUAL                                                                          Group Therapy Note    Date: 4/19/2023    Group Start Time:  2:00 PM  Group End Time:  2:45 PM  Group Topic: [employer]    [facility] 3 ACUTE BEHAV HLTH    [employer]; Paula Donald    IP [address] [facility] GROUP DOCUMENTATION GROUP    Group Therapy Note  Patients were offered the space to reflect on their progress, thoughts, and feelings. Patients were asked to complete a wrap-up assessment. Patients were encouraged to complete an evening routine and include one activity related to self care. Patients were encouraged to provide supportive feedback to peers. Attendees: 11       Attendance: Did not attend    Patient's Goal:  To identify coping skills and engage in peer support      Interventions/techniques: Did not attend    Follows Directions: Did not attend    Interactions: Did not attend    Mental Status: Did not attend    Behavior/appearance: Did not attend    Goals Achieved: Did not attend      Additional Notes:  Patient did not participate in group because they left early.      Luis Garcia, BSW, MSW Student

## 2023-04-19 NOTE — GROUP NOTE
IP  GROUP DOCUMENTATION INDIVIDUAL                                                                          Group Therapy Note    Date: 4/19/2023    Group Start Time: 10:50 AM  Group End Time: 11:40 AM  Group Topic: CBT    [address] [facility] 3 ACUTE BEHAV HLTH    Paula, Ana    IP [address] [facility] GROUP DOCUMENTATION GROUP    Group Therapy Note  Patients were asked to participate in a discussion related to interpersonal effectiveness. Patients were asked to reflect on the acronyms GIVE and FAST. Patients were encouraged to self-disclose current and previous thoughts or behaviors associated with each statement on the worksheet they were provided. Patients were encouraged to provide supportive feedback to peers. Attendees: 9       Attendance: Attended    Patient's Goal:       To practice interpersonal effectiveness skills and engage in peer support     Interventions/techniques: Validated and Promoted peer support    Follows Directions: Followed directions    Interactions: Interacted appropriately    Mental Status: Congruent    Behavior/appearance: Attentive and Cooperative    Goals Achieved: Able to engage in interactions and Able to listen to others      Additional Notes: The patient was engaged during the cognitive skills group. The patient reflected on accepting no and previous relationships where this has been difficult. The patient discussed friendships that have faded due to limited interpersonal effectiveness. The patient was receptive to feedback and provided feedback to peers. The patient will continue to work on engaging in peer support and practicing interpersonal effectiveness.      Anika Lawrence, BSW, MSW Student

## 2023-04-19 NOTE — BH NOTES
OUTPATIENT PHYSICIAN PROGRESS NOTE      Chief Complaint/Symptoms/Impairments (as noted in Treatment Plan): C-PTSD by history. Unspecified mood disorder. Autistic Spectrum Disorder, Attention Deficit Disorder    Criteria for Continued Treatment (check all that apply):   [x] Preventing Decompensation   [x] Improving Level of Functioning  [] Reducing Isolative Behaviors  [] Understanding Diagnosis and Need for Medications  [] Improving Treatment/Medication Compliance  [] Confronting Denial of Illness  [x] Stabilizing Level of Functioning  [] Improving Emotional/Social/Cognitive Functioning  [] Decreasing Frequency of Hospitalizations    Suicidal/Homicidal ideations:   [x] Absent  [] Present  [] Passive  [] Intent  [] Plan  [] Death Wishes      CURRENT CONDITION OF PATIENT & PROGRESS ON TREATMENT PLAN    Subjective (ongoing complaints by patient regarding symptom severity, presentation, affect, function, etc.):     Yannick Estrada reports she is doing ok today. She has an appointment with her  today which was supposed to be yesterday. She will fill her scripts today. Denies any concern. Denies si hi or avh. Sleeping and eating fairly well. At the present time the patient Yannick Estrada remains compliant with taking medications. Denies any adverse events from taking them and feels they have been beneficial.         Behavior (side effects, changes in mental status, objective observations seen in individual sessions or by staff):     Calm and pleasant. Appropriately interactive. Denies si hi or avh. No agitation or aggression. Assessment/Plan (functional improvement and/or ongoing impairment, response to treatment, plan for future ongoing treatment and medication management to include revisions):     Dc vyvanse. Start adderall xr 10 mg daily. Increase wellbutrin xl to 300 mg daily. Continue rest of meds. Group and milieu therapy. Continue php.    [] NO NEW Medications at this time.    [x] New Medication prescribed and prescription provided to patient  [] PHP staff notified of changes as needed    [x] Regarding any medications: patient instructed on purpose, benefits, side effects,   risks, and alternative treatments. Patient verbalizes understanding of instructions and has had the opportunity to ask questions.     Sergio Christianson NP  04/19/23   2:08 PM

## 2023-04-20 ENCOUNTER — HOSPITAL ENCOUNTER (OUTPATIENT)
Dept: BEHAVIORAL/MENTAL HEALTH | Age: 28
Discharge: HOME | End: 2023-04-20
Payer: COMMERCIAL

## 2023-04-20 VITALS
DIASTOLIC BLOOD PRESSURE: 79 MMHG | SYSTOLIC BLOOD PRESSURE: 110 MMHG | HEART RATE: 89 BPM | OXYGEN SATURATION: 99 % | TEMPERATURE: 98.3 F

## 2023-04-20 PROCEDURE — 90853 GROUP PSYCHOTHERAPY: CPT

## 2023-04-21 ENCOUNTER — HOSPITAL ENCOUNTER (OUTPATIENT)
Dept: BEHAVIORAL/MENTAL HEALTH | Age: 28
Discharge: HOME | End: 2023-04-21
Payer: COMMERCIAL

## 2023-04-21 VITALS
SYSTOLIC BLOOD PRESSURE: 165 MMHG | DIASTOLIC BLOOD PRESSURE: 89 MMHG | OXYGEN SATURATION: 100 % | HEART RATE: 106 BPM | TEMPERATURE: 98.4 F

## 2023-04-21 PROCEDURE — 90853 GROUP PSYCHOTHERAPY: CPT

## 2023-04-24 ENCOUNTER — HOSPITAL ENCOUNTER (OUTPATIENT)
Dept: BEHAVIORAL/MENTAL HEALTH | Age: 28
Discharge: HOME | End: 2023-04-24
Payer: COMMERCIAL

## 2023-04-24 VITALS
SYSTOLIC BLOOD PRESSURE: 131 MMHG | OXYGEN SATURATION: 98 % | DIASTOLIC BLOOD PRESSURE: 91 MMHG | HEART RATE: 90 BPM | TEMPERATURE: 98.4 F

## 2023-04-24 PROCEDURE — 90853 GROUP PSYCHOTHERAPY: CPT

## 2023-04-24 NOTE — PROGRESS NOTES
[facility] Program[facility]al Health  Psychotherapy Note      Diagnosis: F39 Unspecified mood disorder    General Information    GOAL: Individual session and treatment plan review    Psychotherapy Session    Start time: 10:09  Stop time: 10:45    Problem number: 2  Short term goal (STP): 2.1 and 2.2    Patient Mental Status and Mood/Affect:Worried and Other: Playful    Patient Behavior and Appearance: Neatly Groomed, Poor Eye Contact , Attentive, and Cooperative    Intervention/Techniques: Informed, Validated/Supported, Listened/Empathized, Queired/Probed, and Provided Feedback    Focus of Session/Patient Response and Progress Towards Goal: Staff met with patient for individual session and treatment plan review. Session focused on goals 2.1, applying coping for depression, and 2.2, applying coping for anxiety/stress. Patient presented as receptive and engaged in treatment plan review, to include identifying areas of growth and areas of continued need. Patient endorsed ongoing anxiety symptoms and stated perception that her physical agitation is related to anxiety rather than ADHD. Patient expressed improvements to motivation related to antidepressants and restarting ADHD medications. Patient discussed thoughts on needs for continuing medication changes and monitoring. Patient and staff discussed discharge needs. Patient expressed desire to discharge on 5/4/23 and identified motivation to continue working on anxiety and trauma. Patient continues to present as appropriate for treatment in [facility].     Clinician Signature: ANGY Long

## 2023-04-24 NOTE — GROUP NOTE
DANIELA  GROUP DOCUMENTATION INDIVIDUAL                                                                          Group Therapy Note    Date: 4/24/2023    Group Start Time:  2:00 PM  Group End Time:  2:45 PM  Group Topic: Community Meeting    [facility] Lidia Thakkar [address] [facility] GROUP DOCUMENTATION GROUP    Group Therapy Note    Attendees: 10/10    Community Wrap Up: pts were asked to reflect on how they felt the day went and if they feel they are progressing towards their goals. Pts then filled out afternoon wrap up group worksheet and shared feedback with peers and reflected on the day. Pts then identified one thing they were going to do to take care of themselves at the end of group       Attendance: Attended    Patient's Goal:  attend activities and groups     Interventions/techniques: Validated, Promoted peer support, Provide feedback, and Supported    Follows Directions: Followed directions    Interactions: Interacted appropriately    Mental Status: Calm, Congruent, and Flat    Behavior/appearance: Attentive, Motivated, and Neatly groomed    Goals Achieved: Able to engage in interactions, Able to listen to others, Able to give feedback to another, and Able to self-disclose      Additional Notes:  pt spoke about how she has had to stand up for herself in the work place a lot so that she can have the time to work on her mental health. Pt was supportive and encouraging of peers to make sure that they don't feel intimidated by their bosses.  Pt is making progress towards goals by attending and participating in group     [facility]

## 2023-04-24 NOTE — GROUP NOTE
DANIELA  GROUP DOCUMENTATION INDIVIDUAL                                                                          Group Therapy Note    Date: 4/24/2023    Group Start Time:  9:50 AM  Group End Time: 10:40 AM  Group Topic: Process Group - Inpatient    [facility] Lidia Rivera [address] [facility] GROUP DOCUMENTATION GROUP    Group Therapy Note    Attendees: 9/9    Process: pts were provided with worksheets regarding attachment styles and boundaries. Pts were asked to identify which attachment style they most feel is representative of them. Pts then discussed varying relationships (intimate, friendships, family) that they have in their life and to identify if they feel it is healthy or unhealthy. Pts were encouraged to provide feedback to one another and reflect on group       Attendance: Attended    Patient's Goal:  attend activities and groups     Interventions/techniques: Validated, Promoted peer support, Provide feedback, and Supported    Follows Directions: Followed directions    Interactions: Interacted appropriately    Mental Status: Calm, Congruent, and Flat    Behavior/appearance: Attentive, Motivated, and Neatly groomed    Goals Achieved: Able to engage in interactions, Able to listen to others, and Able to reflect/comment on own behavior      Additional Notes:  pt briefly spoke about how she has made a conscious effort to distance herself from people who she felt were not genuinely there for her.  Pt was then taken for individual session with therapist. Pt is making progress towards goals by attending and participating in group     [facility]

## 2023-04-24 NOTE — GROUP NOTE
DANIELA  GROUP DOCUMENTATION INDIVIDUAL                                                                          Group Therapy Note    Date: 4/24/2023    Group Start Time: 12:20 PM  Group End Time:  1:05 PM  Group Topic: Process Group - Outpatient    [facility] Lidia Rivera [address] [facility] GROUP DOCUMENTATION GROUP    Group Therapy Note    Attendees: 9/10    Process: pts were asked how they are doing as a check in question. Pts were then provided with a worksheet regarding relationship values and what is important for them to have in a relationship. Pts identified what they feel they need in a relationship and why it is important to them. Pts encouraged to provide feedback to one another and reflect on group       Attendance: Attended    Patient's Goal:  attend activities and groups     Interventions/techniques: Validated, Promoted peer support, Provide feedback, and Supported    Follows Directions: Followed directions    Interactions: Interacted appropriately    Mental Status: Calm and Congruent    Behavior/appearance: Attentive, Motivated, and Neatly groomed    Goals Achieved: Able to engage in interactions, Able to listen to others, Able to reflect/comment on own behavior, and Able to manage/cope with feelings      Additional Notes:  Pt spoke about how money is something that she thinks should be spoken more about in relationships. Pt spoke about being forced into a position where they were financially supporting their partner and it drained their savings. Pt said that money can be a huge stressor in relationships.  Pt is making progress towards goals by attending and participating in [facility]

## 2023-04-24 NOTE — GROUP NOTE
ADNIELA  GROUP DOCUMENTATION INDIVIDUAL                                                                          Group Therapy Note    Date: 4/24/2023    Group Start Time: 10:50 AM  Group End Time: 11:40 AM  Group Topic: Education Group - Outpatient    [facility] 3 ACUTE BEHAV HLTH    Weeks, Kristal    DANIELA [address] [facility] GROUP DOCUMENTATION GROUP    Group Therapy Note  This writer facilitated a cognitive skills group on cognitive distortions. This writer provided education on core beliefs as causing cognitive distortions and how cognitive distortions impact behavior. This writer encouraged and supported patients in identifying their distortions and impact on their lives. This writer supported peer feedback. Attendees: 10       Attendance: Attended    Patient's Goal:  increasing insight into cognitive distortions. Interventions/techniques: Informed and Validated    Follows Directions: Followed directions    Interactions: Interacted appropriately    Mental Status: Calm and Congruent    Behavior/appearance: Attentive and Cooperative    Goals Achieved: Able to engage in interactions, Able to listen to others, and Able to self-disclose      Additional Notes:  Patient presented as alert and engaged. Patient listened respectfully. Patient read the materials for peers. Patient identified cognitive distortions that the patient most commonly experiences. Patient will continue to work on increasing insight into cognitive distortions.      Ricci Martinez

## 2023-04-24 NOTE — GROUP NOTE
DANIELA  GROUP DOCUMENTATION INDIVIDUAL                                                                          Group Therapy Note    Date: 4/24/2023    Group Start Time:  1:10 PM  Group End Time:  2:00 PM  Group Topic: Education Group - Outpatient    [facility] [place] Lidia Fry [address] [facility] GROUP DOCUMENTATION GROUP    Group Therapy Note    Attendees: 9/10    Psych Ed: pts were asked to identify which cognitive distortion they feel they tend to use the most. Pts were then provided a worksheet in which they learned ways using evidence, identifying assumptions and type of thinking to help challenge negative thoughts. Pts were encouraged to provide positive feedback to one another and reflect on group. Attendance: Attended    Patient's Goal:  attend activities and groups     Interventions/techniques: Validated, Promoted peer support, Provide feedback, and Supported    Follows Directions: Followed directions    Interactions: Interacted appropriately    Mental Status: Calm, Congruent, and Flat    Behavior/appearance: Attentive, Motivated, Neatly groomed, and Withdrawn/quiet    Goals Achieved: Able to engage in interactions, Able to listen to others, Able to reflect/comment on own behavior, and Able to manage/cope with feelings      Additional Notes:  pt spoke about how she has to sometimes take a moment to be able to challenge her thoughts because doing it in the moment can be too overwhelming.  Pt is making progress towards goals by attending and participating in group     [facility]
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
## 2023-04-25 ENCOUNTER — HOSPITAL ENCOUNTER (OUTPATIENT)
Dept: BEHAVIORAL/MENTAL HEALTH | Age: 28
Discharge: HOME | End: 2023-04-25
Payer: COMMERCIAL

## 2023-04-25 VITALS
DIASTOLIC BLOOD PRESSURE: 89 MMHG | OXYGEN SATURATION: 98 % | SYSTOLIC BLOOD PRESSURE: 143 MMHG | TEMPERATURE: 98.4 F | HEART RATE: 106 BPM

## 2023-04-25 PROCEDURE — 90853 GROUP PSYCHOTHERAPY: CPT
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
## 2023-04-25 NOTE — GROUP NOTE
DANIELA  GROUP DOCUMENTATION INDIVIDUAL                                                                          Group Therapy Note    Date: 4/25/2023    Group Start Time: 10:50 AM  Group End Time: 11:40 AM  Group Topic: Education Group - Outpatient    [address] [facility] 3 ACUTE BEHAV HLTH    Weeks, Kristal    DANIELA 1150 [facility] GROUP DOCUMENTATION GROUP    Group Therapy Note  This writer facilitated a cognitive skills group on learning the difference between shame, unhealthy guilt, and healthy guilt. This writer provided education on the difference, how each develops, the impact of each, and how to address each feeling. This writer provided education connecting this information to core beliefs and systemic forms of oppression, such as racism and mental health stigma. This writer encouraged and supported patients in identifying their distortions and impact on their lives. This writer supported peer feedback. Attendees: 9       Attendance: Attended    Patient's Goal:  increasing insight into shame versus healthy guilt versus unhealthy guilt. Interventions/techniques: Informed, Validated, and Promoted peer support    Follows Directions: Followed directions    Interactions: Interacted appropriately    Mental Status: Depressed and Other Tearful    Behavior/appearance: Attentive, Cooperative, and Motivated    Goals Achieved: Able to engage in interactions, Able to listen to others, and Able to self-disclose      Additional Notes:  Patient presented as alert and engaged. Patient listened respectfully. Patient read the materials for peers. Patient self-disclosed about experiences with shame and responses to her diagnoses rooted in ableism and mental health stigma. Patient presented as tearful while discussing. Patient will continue to work on increasing insight into shame versus healthy guilt versus unhealthy guilt.      Capri Montez

## 2023-04-25 NOTE — GROUP NOTE
DANIELA  GROUP DOCUMENTATION INDIVIDUAL                                                                          Group Therapy Note    Date: 4/25/2023    Group Start Time: 12:20 PM  Group End Time:  1:05 PM  Group Topic: Process Group - Outpatient    [facility] Lidia Rivera 1150 [facility] GROUP DOCUMENTATION GROUP    Group Therapy Note    Attendees: 7/9    Process: Pts were asked how they were doing as a check in question. Pts then participated in activity in which they wrote down things that they would like to say to themselves from one year ago. Pts then shared what they are proud of themselves for achieving and things they want to let go of.  Pts were encouraged to provide positive feedback to one another and reflect on group       Attendance: Did not attend  Additional Notes:  Pt was not in group due to talking with therapist     Elsa Pathak

## 2023-04-25 NOTE — PROGRESS NOTES
GOAL: Attendance    Patient left a voicemail at 8:54 AM stating she would be in late due to waking up late.      ANGY Ayon

## 2023-04-25 NOTE — GROUP NOTE
DANIELA  GROUP DOCUMENTATION INDIVIDUAL                                                                          Group Therapy Note    Date: 4/25/2023    Group Start Time:  2:00 PM  Group End Time:  2:45 PM  Group Topic: Community Meeting    [facility] Lidia Rivera 1150 [facility] GROUP DOCUMENTATION GROUP    Group Therapy Note    Attendees: 9/9    Community Wrap Up Group: Pts were asked how they felt the day had gone as a check in question. Pts then participated in art therapy and painted a mask that they felt represents them. Pts were encouraged to provide feedback to one another regarding how the day went and reflect on progress towards goals       Attendance: Attended    Patient's Goal:  attend activities and groups     Interventions/techniques: Validated, Promoted peer support, Provide feedback, and Supported    Follows Directions: Followed directions    Interactions: Interacted appropriately    Mental Status: Calm, Congruent, and Flat    Behavior/appearance: Attentive, Motivated, and Neatly groomed    Goals Achieved: Able to engage in interactions, Able to listen to others, and Able to reflect/comment on own behavior      Additional Notes:  Pt stated that they felt they felt they were able to release emotions today and felt that it was helpful. Pt spoke briefly about her son and how he would have enjoyed the art project. Pt denied SI/HI on wrap up group work sheet. Pt is making progress towards goals by attending and participating in group.     DELFINA

## 2023-04-25 NOTE — PROGRESS NOTES
GOAL: Family contact    Staff called pt's grandmother to do a check in and schedule a family session. Grandmother reported observing patterns of pt's depression symptoms starting in October and ends in April. Grandmother noted pt is going out more often and not oversleeping. Grandmother noted pt still presents as tense and holding in anger. Grandmother gave a brief history of her attempts to support the pt when the pt was younger. Grandmother stated that treatment is the first time that the pt's past has been addressed in a constructive manner. Grandmother noted the pt has opened up more to Grandmother since pt started the program. Yadira Mahajan requested a time starting at 14:00 PM. Staff stated she would discuss with her manager and follow up the following day to verify day and time.     ANGY Wilson

## 2023-04-26 ENCOUNTER — HOSPITAL ENCOUNTER (OUTPATIENT)
Dept: BEHAVIORAL/MENTAL HEALTH | Age: 28
Discharge: HOME | End: 2023-04-26
Payer: COMMERCIAL

## 2023-04-26 VITALS
SYSTOLIC BLOOD PRESSURE: 130 MMHG | DIASTOLIC BLOOD PRESSURE: 79 MMHG | HEART RATE: 91 BPM | TEMPERATURE: 98.5 F | OXYGEN SATURATION: 97 %

## 2023-04-26 PROCEDURE — 90853 GROUP PSYCHOTHERAPY: CPT

## 2023-04-26 NOTE — GROUP NOTE
DANIELA  GROUP DOCUMENTATION INDIVIDUAL                                                                          Group Therapy Note    Date: 4/26/2023    Group Start Time:  9:50 AM  Group End Time: 10:40 AM  Group Topic: Process Group - Outpatient    [place] [address] ACUTE BEHAV HLTH    Ana Paula    IP 1150 [facility] GROUP DOCUMENTATION GROUP    Group Therapy Note  Patients were given the space to share thoughts, emotions, and experiences that they desired feedback on. Patients were encouraged to self-disclose and engage in peer support. Attendees: 10       Attendance: Attended    Patient's Goal:  To self-disclose and engage in peer support     Interventions/techniques: Validated and Promoted peer support    Follows Directions: Followed directions    Interactions: Interacted appropriately    Mental Status: Congruent    Behavior/appearance: Attentive and Cooperative    Goals Achieved: Able to listen to others and Able to reflect/comment on own behavior      Additional Notes: The patient was engaged during the process group. The patient presented attentively with a congruent mood. The patient reflected on mental health as a part of the human experience.  The patient disclosed their experiences with an ADHD diagnosis, as well as their childs diagnosis. The patient shared their childs progress and experiences in school. The patient was receptive to feedback and engaged in peer support. The patient will continue to process thoughts, feelings, and experiences during future groups.      Oral CHAD LamaW, MSW Student

## 2023-04-26 NOTE — GROUP NOTE
DANIELA  GROUP DOCUMENTATION INDIVIDUAL                                                                          Group Therapy Note    Date: 4/26/2023    Group Start Time:  1:10 PM  Group End Time:  2:00 PM  Group Topic: Guest Group    [facility] 3 ACUTE BEHAV SAMTH    America Christian    [facility] GROUP DOCUMENTATION GROUP    Group Therapy Note    Peer  Fabienne lead this group. Fabienne discussed her experience with substance use and mental health. Patients were asked to identify triggers for substance use and mental health. Patients were asked to identify how they react and behave during distress. Patients were encouraged to reflect on strengths. Patients were encouraged to ask questions to [facility]. Attendees: 9       Attendance: Attended    Patient's Goal:  Listen to guest speaker, identify triggers and responses to distress     Interventions/techniques: Promoted peer support and Provide feedback    Follows Directions: Followed directions    Interactions: Interacted appropriately    Mental Status: Calm and Congruent    Behavior/appearance: Attentive and Cooperative    Goals Achieved: Able to engage in interactions, Able to give feedback to another, Able to reflect/comment on own behavior, and Identified triggers      Additional Notes: The patient engaged actively in peer support group. The patient discussed how she has identified triggers in her life. The patient identified having a fight response to distress. The patient offered support and feedback to peers. The patient left group briefly to meet with TAIWO Blcak. The patient will continue to practice identifying triggers and engaging in groups.     Emma Escobar, BSW, MSW Student

## 2023-04-26 NOTE — GROUP NOTE
DANIELA  GROUP DOCUMENTATION INDIVIDUAL                                                                          Group Therapy Note    Date: 4/26/2023    Group Start Time:  9:00 AM  Group End Time:  9:50 AM  Group Topic: [employer]    [facility] - LUKE [address] [facility] GROUP DOCUMENTATION GROUP    Group Therapy Note    Patients were given an outpatient check-in form to complete. SW read the quote of the day from the \"365 Days of Dawna Zamudio" book upon request. Patients were asked to reflect on the previous evening and recent experiences. Patients discussed topics relating to physical health and unhealthy eating patterns. Patients were encouraged to self-disclose and receive feedback. Patients were encouraged to offer feedback to each other. Attendees: 10       Attendance: Attended    Patient's Goal:  Connecting with peers, discussing recent experiences     Interventions/techniques: Validated and Promoted peer support    Follows Directions: Followed directions    Interactions: Interacted appropriately    Mental Status: Calm and Congruent    Behavior/appearance: Attentive and Cooperative    Goals Achieved: Able to engage in interactions, Able to reflect/comment on own behavior, Able to self-disclose, and Identified triggers      Additional Notes: The patient engaged in check-in group. The patient joined group late. The patient sat quietly and observed for most of the group discussion. The patient offered feedback and support to peers. The patient discussed how her history of trauma, poverty, and homelessness has impacted her relationship with food. The patient discussed having to learn a lot about nutrition and science in order to begin healing her relationship with food. The patient will continue to practice reflecting on physical and mental health.      CHAD GarayW, MSW Student

## 2023-04-26 NOTE — GROUP NOTE
DANIELA  GROUP DOCUMENTATION INDIVIDUAL                                                                          Group Therapy Note    Date: 4/26/2023    Group Start Time: 12:10 PM  Group End Time:  1:10 PM  Group Topic: Education Group - Outpatient    [facility] 3 ACUTE BEHAV HLTH    Ana Paula    IP [address]150 [facility] GROUP DOCUMENTATION GROUP    Group Therapy Note  Patients were provided with a worksheet that discussed sleep hygiene and tips for a better morning.  Patients were asked to reflect on their current sleeping patterns and morning routines. Patients were encouraged to identify ways they can improve their sleep and routines. Patients were encouraged to self disclose and engage in peer support. Attendees: 10       Attendance: Attended    Patient's Goal:  To understand sleep hygiene and provide peer support. Interventions/techniques: Informed and Promoted peer support    Follows Directions: Followed directions    Interactions: Interacted appropriately    Mental Status: Congruent    Behavior/appearance: Attentive and Cooperative    Goals Achieved: Able to engage in interactions and Able to listen to others      Additional Notes: The patient was engaged during the health education group. The patient presented attentively and listened respectfully to peers. The patient reported that they have difficulty falling asleep due to excessive thoughts and feeling unfulfilled during the day. The patient disclosed that they often experience nightmares, flashbacks, and sleep paralysis while sleeping. The patient reported that they have sought various forms of treatment and medications to improve their sleep function. The patient reported that they receive around three to five hours of sleep per night. The patient was receptive to feedback and provided supportive feedback to peers.  The patient will continue to work on improving sleep hygiene during future groups    Saige Pierre, [address], MSW Student

## 2023-04-26 NOTE — BH NOTES
OUTPATIENT PHYSICIAN PROGRESS NOTE      Chief Complaint/Symptoms/Impairments (as noted in Treatment Plan): C-PTSD by history. Unspecified mood disorder. Autistic Spectrum Disorder, Attention Deficit Disorder    Criteria for Continued Treatment (check all that apply):   [x] Preventing Decompensation   [x] Improving Level of Functioning  [] Reducing Isolative Behaviors  [] Understanding Diagnosis and Need for Medications  [] Improving Treatment/Medication Compliance  [] Confronting Denial of Illness  [x] Stabilizing Level of Functioning  [] Improving Emotional/Social/Cognitive Functioning  [] Decreasing Frequency of Hospitalizations    Suicidal/Homicidal ideations:   [x] Absent  [] Present  [] Passive  [] Intent  [] Plan  [] Death Wishes      CURRENT CONDITION OF PATIENT & PROGRESS ON TREATMENT PLAN    Subjective (ongoing complaints by patient regarding symptom severity, presentation, affect, function, etc.):     Macey Le reports she is doing \"all right\" today. Says depression took a hit the other day, says mood is generally up and down but the other day her mood was lower than usual. Says wellbutrin is working so far. She reports she continues to struggle with focusing, \"tease through my thoughts because I have so much things to do. \" Denies si hi or avh. Sleeping and eating fairly well. At the present time the patient Macey Le remains compliant with taking medications. Denies any adverse events from taking them and feels they have been beneficial.         Behavior (side effects, changes in mental status, objective observations seen in individual sessions or by staff):     Calm and pleasant. Appropriately interactive. Denies si hi or avh. No agitation or aggression. Assessment/Plan (functional improvement and/or ongoing impairment, response to treatment, plan for future ongoing treatment and medication management to include revisions): Increase adderall xr to 20 mg daily.    Continue rest of meds.  Group and milieu therapy. Continue php.    [] NO NEW Medications at this time. [x] New Medication prescribed and prescription provided to patient  [] PHP staff notified of changes as needed    [x] Regarding any medications: patient instructed on purpose, benefits, side effects,   risks, and alternative treatments. Patient verbalizes understanding of instructions and has had the opportunity to ask questions.     Kent Prader, NP  04/26/23   2:01 PM

## 2023-04-26 NOTE — GROUP NOTE
IP  GROUP DOCUMENTATION INDIVIDUAL                                                                          Group Therapy Note    Date: 4/26/2023    Group Start Time: 10:55 AM  Group End Time: 11:40 AM  Group Topic: Education Group - Outpatient    [facility] 3 ACUTE BEHAV TH    America Christian     [facility]    Group Therapy Note    Patients were asked to reflect on and identify how different emotions feel in the body. Patients were given a sheet with an outline of a body to aid in reflection and visualization. Patients discussed experiences and thoughts on crying. Patients were asked to identify different ways to express emotions. Patients were encouraged to offer feedback and support to each other. Attendees: 9       Attendance: Attended    Patient's Goal:  Identify physical sensations associated with emotions, identify ways to express emotions     Interventions/techniques: Challenged, Validated, and Promoted peer support    Follows Directions: Followed directions    Interactions: Interacted appropriately    Mental Status: Calm and Congruent    Behavior/appearance: Attentive and Cooperative    Goals Achieved: Able to engage in interactions, Able to give feedback to another, Able to reflect/comment on own behavior, and Identified feelings      Additional Notes: The patient engaged actively in cognitive skills group. The patient discussed having difficulty identifying physical feelings associated with emotions, and identified her emotions as cognitive and logical. The patient identified using childrens TV shows to help learn about how emotions and anxiety works. The patient discussed impacts of culture and upbringing on how she feels comfortable crying. The patient discussed working on feeling more comfortable crying in front of people and highlighted the importance of seeking help and support. The patient was receptive to feedback and support from peers.     CHAD LandinW, MSW Student

## 2023-04-26 NOTE — PROGRESS NOTES
GOAL: Family session coordination    Staff left a voicemail for pt's grandmother confirming that the 14:00-14:30 time for a family session had been approved by management. Staff provided available days to schedule the family session and requested a call back. Staff provided her direct office number.     ANGY Peguero

## 2023-04-26 NOTE — GROUP NOTE
DANIELA  GROUP DOCUMENTATION INDIVIDUAL                                                                          Group Therapy Note    Date: 4/26/2023    Group Start Time:  2:00 PM  Group End Time:  2:45 PM  Group Topic: [employer]    [facility] 3 ACUTE BEHAV HLTH    Weeks, Kristal    DANIELA 1150 [facility] GROUP DOCUMENTATION GROUP    Group Therapy Note  This writer facilitated a community wrap up group focused on assessing for safety, coping skills practice, and celebrating the successful discharge of a patient. The writer facilitated a symptom and safety check in using the afternoon check in form. The writer facilitated a music-based mindfulness practice and music appreciation discussion. The writer encouraged patients to identify emotions and reflect on experience of mindfulness. The writer encouraged patients to provide feedback to the discharging member and the patient to share thoughts with his peers. Attendees: 10       Attendance: Attended    Patient's Goal:  disclosing safety concerns and practicing mindfulness. Interventions/techniques: Validated, Promoted peer support, and Other Music integration    Follows Directions: Followed directions    Interactions: Interacted appropriately    Mental Status: Calm and Congruent    Behavior/appearance: Attentive and Cooperative    Goals Achieved: Able to engage in interactions and Able to listen to others      Additional Notes:  Patient presented as alert and engaged. Patient denied SI/HI/CLARISSA on the check out form. Patient listened respectfully to peers and to their songs. Patient discussed responses to mindfulness and songs. Patient will continue to work on disclosing safety concerns and practicing mindfulness.      Marilynn Lovelace

## 2023-04-27 ENCOUNTER — HOSPITAL ENCOUNTER (OUTPATIENT)
Dept: BEHAVIORAL/MENTAL HEALTH | Age: 28
Discharge: HOME | End: 2023-04-27
Payer: COMMERCIAL

## 2023-04-27 VITALS
TEMPERATURE: 97.8 F | OXYGEN SATURATION: 97 % | SYSTOLIC BLOOD PRESSURE: 131 MMHG | HEART RATE: 112 BPM | DIASTOLIC BLOOD PRESSURE: 88 MMHG

## 2023-04-27 PROCEDURE — 90853 GROUP PSYCHOTHERAPY: CPT

## 2023-04-28 ENCOUNTER — HOSPITAL ENCOUNTER (OUTPATIENT)
Dept: BEHAVIORAL/MENTAL HEALTH | Age: 28
Discharge: HOME | End: 2023-04-28
Payer: COMMERCIAL

## 2023-04-28 VITALS
SYSTOLIC BLOOD PRESSURE: 119 MMHG | OXYGEN SATURATION: 99 % | DIASTOLIC BLOOD PRESSURE: 77 MMHG | HEART RATE: 117 BPM | TEMPERATURE: 97.4 F

## 2023-04-28 PROCEDURE — 90853 GROUP PSYCHOTHERAPY: CPT

## 2023-05-01 ENCOUNTER — HOSPITAL ENCOUNTER (OUTPATIENT)
Dept: BEHAVIORAL/MENTAL HEALTH | Age: 28
Discharge: HOME | End: 2023-05-01
Payer: COMMERCIAL

## 2023-05-01 VITALS
HEART RATE: 102 BPM | OXYGEN SATURATION: 99 % | DIASTOLIC BLOOD PRESSURE: 92 MMHG | TEMPERATURE: 98.8 F | SYSTOLIC BLOOD PRESSURE: 145 MMHG

## 2023-05-01 PROCEDURE — 90853 GROUP PSYCHOTHERAPY: CPT

## 2023-05-01 NOTE — GROUP NOTE
DANIELA  GROUP DOCUMENTATION INDIVIDUAL                                                                          Group Therapy Note    Date: 5/1/2023    Group Start Time: 10:50 AM  Group End Time: 11:40 AM  Group Topic: Education Group - Outpatient    [address] [facility] 3 ACUTE BEHAV HLTH    Weeks, Kristal    IP 1150 [facility] GROUP DOCUMENTATION GROUP    Group Therapy Note    This writer facilitated a psychoeducation group on boundaries. Patients were provided a handout on porous versus rigid versus healthy boundaries as well as different areas of life and how boundaries apply. The writer encouraged patients to share about their experiences with boundaries. The writer encouraged and supported peer discussion and feedback. Attendees: 6       Attendance: Attended    Patient's Goal:  understanding healthy boundaries. Interventions/techniques: Informed, Validated, and Promoted peer support    Follows Directions: Followed directions    Interactions: Interacted appropriately    Mental Status: Calm and Congruent    Behavior/appearance: Attentive and Cooperative    Goals Achieved: Able to give feedback to another and Able to self-disclose      Additional Notes:  Patient presented as alert and engaged. Patient listened respectfully to staff and peers. Patient read from the handout for peers. Patient self-disclosed about experiences related to boundaries and provided peer feedback. Patient will continue to work on understanding healthy boundaries.      Halima Phelps

## 2023-05-01 NOTE — GROUP NOTE
DANIELA  GROUP DOCUMENTATION INDIVIDUAL                                                                          Group Therapy Note    Date: 5/1/2023    Group Start Time:  1:10 PM  Group End Time:  2:00 PM  Group Topic: Education Group - Outpatient    137 [facility] 3 ACUTE BEHAV [facility] 1150 [facility] GROUP DOCUMENTATION GROUP    Group Therapy Note    This writer facilitated the psychoeducation group with a focus on communication and using I statements. The patients were encouraged to participate in review of educational materials and to participate in a role play exercise using I statements from the materials. Attendees: 7       Attendance: Attended    Patient's Goal:  improve communication    Interventions/techniques: Informed    Follows Directions: Followed directions    Interactions: Interacted appropriately    Mental Status: Calm and Congruent    Behavior/appearance: Attentive, Cooperative, and Motivated    Goals Achieved: Able to engage in interactions and Able to listen to others      Additional Notes: The patient participated in review of educational materials on communication. The patient participated in role play activity to utilize I statements in different scenarios and discuss. The patient presented as hyper verbal during group, but not disruptive. The patient will continue to be open to educational materials in psychoeducation group.     Payam Atkinson

## 2023-05-01 NOTE — GROUP NOTE
DANIELA  GROUP DOCUMENTATION INDIVIDUAL                                                                          Group Therapy Note    Date: 5/1/2023    Group Start Time: 12:20 PM  Group End Time:  1:05 PM  Group Topic: Relapse Prevention/Role Play Group    137 [facility] [address] OP SUE    Maryana Lidia    IP 1150 [facility] GROUP DOCUMENTATION GROUP    Group Therapy Note    Attendees: 6/7    Pts were asked how they personify their mental health as a check in question. Pts then discussed different ways they can symbolize their mental health into colours, animals, sounds and flavour. Pts encouraged to provide feedback to one another and reflect on group. Attendance: Attended    Patient's Goal:  attend activities and groups     Interventions/techniques: Validated, Promoted peer support, Provide feedback, and Supported    Follows Directions: Followed directions    Interactions: Interacted appropriately    Mental Status: Calm, Congruent, and Flat    Behavior/appearance: Attentive, Motivated, and Neatly groomed    Goals Achieved: Able to engage in interactions, Able to listen to others, and Able to reflect/comment on own behavior      Additional Notes:  Pt spoke about how she personifies her mental health as a 'prehistoric sloth'. Pt reports that they are resilient but 'slow moving'.  Pt is making progress towards goals by attending and participating in group     [facility]

## 2023-05-01 NOTE — GROUP NOTE
DANIELA  GROUP DOCUMENTATION INDIVIDUAL                                                                          Group Therapy Note    Date: 5/1/2023    Group Start Time:  2:00 PM  Group End Time:  2:45 PM  Group Topic: [employer]    [facility]    Weeks, Kristal    DANIELA [address] [facility] GROUP DOCUMENTATION GROUP    Group Therapy Note  This writer facilitated a community wrap up group focused on assessing for safety and planning ways to engage in healthy coping while in the community. The writer facilitated a symptom and safety check in using the afternoon check in form. The writer encouraged and supported patients in identifying coping skills to use at home, to include supporting problem solving to address barriers. The writer provided education and feedback related to boundaries based on reports from patients. Attendees: 6       Attendance: Attended    Patient's Goal: disclosing safety concerns and planning for healthy skills use outside of groups. Interventions/techniques: Informed, Validated, Promoted peer support, and Provide feedback    Follows Directions: Followed directions    Interactions: Interacted appropriately    Mental Status: Calm and Congruent    Behavior/appearance: Attentive and Cooperative    Goals Achieved: Able to give feedback to another, Able to receive feedback, and Able to self-disclose      Additional Notes:  Patient presented as alert and engaged. Patient denied SI/HI/CLARISSA on the check out form. Patient listened respectfully to staff and peers. Patient was observed to complete the activity. Patient shared responses with peers. Patient provided peer feedback and presented as receptive to feedback. Patient will continue to work on disclosing safety concerns and planning for healthy skills use outside of groups.     Nava Watson
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
## 2023-05-01 NOTE — GROUP NOTE
DANIELA BH GROUP DOCUMENTATION INDIVIDUAL                                                                          Group Therapy Note    Date: 5/1/2023    Group Start Time:  9:50 AM  Group End Time: 10:40 AM  Group Topic: Process Group - Inpatient    [address] OP Lidia Rivera [facility] DOCUMENTATION GROUP    Group Therapy Note    Attendees: 7    Process: pts asked how they are doing as a check in question. Pts then encouraged to discuss boundaries and how they navigate difficult conversations in personal relationships. Pts encouraged to provide positive feedback to one another and how these difficult conversations impact their relationships. Pts were then encouraged to reflect on group       Attendance: Attended    Patient's Goal:  attend activities and groups     Interventions/techniques: Validated, Promoted peer support, Provide feedback, and Supported    Follows Directions: Followed directions    Interactions: Interacted appropriately    Mental Status: Calm, Congruent, and Flat    Behavior/appearance: Attentive, Motivated, and Neatly groomed    Goals Achieved: Able to engage in interactions, Able to listen to others, Able to give feedback to another, Able to reflect/comment on own behavior, Able to self-disclose, and Discussed coping      Additional Notes:  Pt spoke about how she is proud of herself for handling a difficult conversation with her sons father this weekend. Pt reported that she feels that she would have acted differently if she was not working on herself. Pt provided positive feedback to peers and was supportive.  Pt is making progress towards goals by attending and participating in group    [facility]

## 2023-05-02 ENCOUNTER — HOSPITAL ENCOUNTER (OUTPATIENT)
Dept: BEHAVIORAL/MENTAL HEALTH | Age: 28
Discharge: HOME | End: 2023-05-02
Payer: COMMERCIAL

## 2023-05-02 VITALS
HEART RATE: 95 BPM | TEMPERATURE: 98.5 F | SYSTOLIC BLOOD PRESSURE: 137 MMHG | RESPIRATION RATE: 16 BRPM | DIASTOLIC BLOOD PRESSURE: 86 MMHG | OXYGEN SATURATION: 99 %

## 2023-05-02 PROCEDURE — 90853 GROUP PSYCHOTHERAPY: CPT

## 2023-05-03 ENCOUNTER — HOSPITAL ENCOUNTER (OUTPATIENT)
Dept: BEHAVIORAL/MENTAL HEALTH | Age: 28
Discharge: HOME | End: 2023-05-03
Payer: COMMERCIAL

## 2023-05-03 VITALS
HEART RATE: 98 BPM | OXYGEN SATURATION: 98 % | SYSTOLIC BLOOD PRESSURE: 128 MMHG | TEMPERATURE: 98.6 F | DIASTOLIC BLOOD PRESSURE: 75 MMHG

## 2023-05-03 PROCEDURE — 90853 GROUP PSYCHOTHERAPY: CPT

## 2023-05-03 PROCEDURE — 90834 PSYTX W PT 45 MINUTES: CPT

## 2023-05-03 PROCEDURE — 90832 PSYTX W PT 30 MINUTES: CPT

## 2023-05-04 ENCOUNTER — HOSPITAL ENCOUNTER (OUTPATIENT)
Dept: BEHAVIORAL/MENTAL HEALTH | Age: 28
Discharge: HOME | End: 2023-05-04
Payer: COMMERCIAL

## 2023-05-04 VITALS
HEART RATE: 108 BPM | OXYGEN SATURATION: 99 % | TEMPERATURE: 97.9 F | DIASTOLIC BLOOD PRESSURE: 91 MMHG | SYSTOLIC BLOOD PRESSURE: 113 MMHG

## 2023-05-04 DIAGNOSIS — F90.2 ATTENTION DEFICIT HYPERACTIVITY DISORDER (ADHD), COMBINED TYPE: Primary | ICD-10-CM

## 2023-05-04 PROCEDURE — 90853 GROUP PSYCHOTHERAPY: CPT

## 2023-05-04 RX ORDER — DEXTROAMPHETAMINE SACCHARATE, AMPHETAMINE ASPARTATE MONOHYDRATE, DEXTROAMPHETAMINE SULFATE AND AMPHETAMINE SULFATE 7.5; 7.5; 7.5; 7.5 MG/1; MG/1; MG/1; MG/1
30 CAPSULE, EXTENDED RELEASE ORAL
Qty: 30 CAPSULE | Refills: 0 | Status: SHIPPED | OUTPATIENT
Start: 2023-05-04 | End: 2023-05-05 | Stop reason: SDUPTHER

## 2023-05-04 RX ORDER — DEXTROAMPHETAMINE SACCHARATE, AMPHETAMINE ASPARTATE, DEXTROAMPHETAMINE SULFATE AND AMPHETAMINE SULFATE 5; 5; 5; 5 MG/1; MG/1; MG/1; MG/1
20 TABLET ORAL DAILY
Qty: 30 TABLET | Refills: 0 | Status: SHIPPED | OUTPATIENT
Start: 2023-05-04 | End: 2023-05-05 | Stop reason: SDUPTHER

## 2023-05-05 ENCOUNTER — APPOINTMENT (OUTPATIENT)
Dept: BEHAVIORAL/MENTAL HEALTH | Age: 28
End: 2023-05-05
Payer: COMMERCIAL

## 2023-05-05 RX ORDER — DEXTROAMPHETAMINE SACCHARATE, AMPHETAMINE ASPARTATE, DEXTROAMPHETAMINE SULFATE AND AMPHETAMINE SULFATE 5; 5; 5; 5 MG/1; MG/1; MG/1; MG/1
20 TABLET ORAL DAILY
Qty: 30 TABLET | Refills: 0 | Status: SHIPPED | OUTPATIENT
Start: 2023-05-05

## 2023-05-05 RX ORDER — DEXTROAMPHETAMINE SACCHARATE, AMPHETAMINE ASPARTATE MONOHYDRATE, DEXTROAMPHETAMINE SULFATE AND AMPHETAMINE SULFATE 7.5; 7.5; 7.5; 7.5 MG/1; MG/1; MG/1; MG/1
30 CAPSULE, EXTENDED RELEASE ORAL
Qty: 30 CAPSULE | Refills: 0 | Status: SHIPPED | OUTPATIENT
Start: 2023-05-05